# Patient Record
Sex: FEMALE | Race: WHITE | Employment: FULL TIME | URBAN - METROPOLITAN AREA
[De-identification: names, ages, dates, MRNs, and addresses within clinical notes are randomized per-mention and may not be internally consistent; named-entity substitution may affect disease eponyms.]

---

## 2024-08-11 ENCOUNTER — HOSPITAL ENCOUNTER (EMERGENCY)
Age: 44
Discharge: HOME | End: 2024-08-11
Attending: EMERGENCY MEDICINE
Payer: OTHER GOVERNMENT

## 2024-08-11 ENCOUNTER — APPOINTMENT (OUTPATIENT)
Dept: GENERAL RADIOLOGY | Age: 44
End: 2024-08-11
Payer: OTHER GOVERNMENT

## 2024-08-11 VITALS
HEART RATE: 81 BPM | TEMPERATURE: 98 F | RESPIRATION RATE: 18 BRPM | SYSTOLIC BLOOD PRESSURE: 140 MMHG | OXYGEN SATURATION: 98 % | DIASTOLIC BLOOD PRESSURE: 87 MMHG

## 2024-08-11 DIAGNOSIS — S82.899S CLOSED FRACTURE OF ANKLE, UNSPECIFIED LATERALITY, SEQUELA: ICD-10-CM

## 2024-08-11 DIAGNOSIS — S93.409A SPRAIN OF ANKLE, UNSPECIFIED LATERALITY, UNSPECIFIED LIGAMENT, INITIAL ENCOUNTER: Primary | ICD-10-CM

## 2024-08-11 PROCEDURE — 73610 X-RAY EXAM OF ANKLE: CPT

## 2024-08-11 PROCEDURE — 99283 EMERGENCY DEPT VISIT LOW MDM: CPT

## 2024-08-11 PROCEDURE — 6370000000 HC RX 637 (ALT 250 FOR IP): Performed by: EMERGENCY MEDICINE

## 2024-08-11 PROCEDURE — 73590 X-RAY EXAM OF LOWER LEG: CPT

## 2024-08-11 RX ORDER — IBUPROFEN 800 MG/1
800 TABLET ORAL
Qty: 90 TABLET | Refills: 0 | Status: SHIPPED | OUTPATIENT
Start: 2024-08-11 | End: 2024-08-11

## 2024-08-11 RX ORDER — HYDROCODONE BITARTRATE AND ACETAMINOPHEN 5; 325 MG/1; MG/1
1 TABLET ORAL EVERY 6 HOURS PRN
Qty: 8 TABLET | Refills: 0 | Status: SHIPPED | OUTPATIENT
Start: 2024-08-11 | End: 2024-08-11 | Stop reason: CLARIF

## 2024-08-11 RX ORDER — IBUPROFEN 800 MG/1
800 TABLET ORAL
Qty: 90 TABLET | Refills: 0 | Status: SHIPPED | OUTPATIENT
Start: 2024-08-11

## 2024-08-11 RX ORDER — IBUPROFEN 800 MG/1
800 TABLET ORAL
Status: COMPLETED | OUTPATIENT
Start: 2024-08-11 | End: 2024-08-11

## 2024-08-11 RX ORDER — OXYCODONE HYDROCHLORIDE AND ACETAMINOPHEN 5; 325 MG/1; MG/1
2 TABLET ORAL ONCE
Status: COMPLETED | OUTPATIENT
Start: 2024-08-11 | End: 2024-08-11

## 2024-08-11 RX ORDER — HYDROCODONE BITARTRATE AND ACETAMINOPHEN 7.5; 325 MG/1; MG/1
1 TABLET ORAL EVERY 8 HOURS PRN
Qty: 6 TABLET | Refills: 0 | Status: SHIPPED | OUTPATIENT
Start: 2024-08-11 | End: 2024-08-13

## 2024-08-11 RX ORDER — HYDROCODONE BITARTRATE AND ACETAMINOPHEN 5; 325 MG/1; MG/1
1 TABLET ORAL EVERY 6 HOURS PRN
Qty: 8 TABLET | Refills: 0 | Status: SHIPPED | OUTPATIENT
Start: 2024-08-11 | End: 2024-08-11

## 2024-08-11 RX ADMIN — IBUPROFEN 800 MG: 800 TABLET, FILM COATED ORAL at 12:26

## 2024-08-11 RX ADMIN — OXYCODONE HYDROCHLORIDE AND ACETAMINOPHEN 2 TABLET: 5; 325 TABLET ORAL at 12:25

## 2024-08-11 ASSESSMENT — LIFESTYLE VARIABLES
HOW OFTEN DO YOU HAVE A DRINK CONTAINING ALCOHOL: PATIENT DECLINED
HOW MANY STANDARD DRINKS CONTAINING ALCOHOL DO YOU HAVE ON A TYPICAL DAY: PATIENT DECLINED

## 2024-08-11 ASSESSMENT — PAIN DESCRIPTION - LOCATION: LOCATION: ANKLE

## 2024-08-11 ASSESSMENT — PAIN SCALES - GENERAL
PAINLEVEL_OUTOF10: 10
PAINLEVEL_OUTOF10: 10

## 2024-08-11 ASSESSMENT — PAIN DESCRIPTION - ORIENTATION: ORIENTATION: LEFT

## 2024-08-11 NOTE — ED TRIAGE NOTES
Pt coming from sidewalk DT via [facility]. Pt fell while walking down steps, denies LOC, hitting head, blood thinners. Pt has swelling and 9/10 pain to left ankle. PMS present     EMS states VSS en route

## 2024-08-11 NOTE — ED NOTES
Patient mobility status  with difficulty, uses a crutches . Provider aware     I have reviewed discharge instructions with the patient and spouse.  The patient and spouse verbalized understanding.    Patient left ED via Discharge Method: wheelchair to Home with Spouse.    Opportunity for questions and clarification provided.     Patient given 1 scripts.       Jelly Rush RN  08/11/24 [id number]

## 2024-08-11 NOTE — ED PROVIDER NOTES
Emergency Department Provider Note       PCP: No, Pcp   Age: 44 y.o.   Sex: female     DISPOSITION Decision To Discharge 08/11/2024 01:33:59 PM       ICD-10-CM    1. Sprain of ankle, unspecified laterality, unspecified ligament, initial encounter  S93.409A HYDROcodone-acetaminophen (LORCET PLUS) 7.5-325 MG per tablet     DISCONTINUED: HYDROcodone-acetaminophen (NORCO) 5-325 MG per tablet     DISCONTINUED: HYDROcodone-acetaminophen (NORCO) 5-325 MG per tablet      2. Closed fracture of ankle, unspecified laterality, sequela  S82.899S           Medical Decision Making     Patient was observed in the emergency department for nearly 3 hours.  Developed no hemodynamic instability.  All x-ray imaging was independently reviewed by me.  Perhaps a nondisplaced distal fibular fracture is present.  I suspect more likely that there to be a severe ligamentous derangement given the amount of discomfort the patient appears to be in.  Will immobilize the ankle in standard fashion, provide crutches, nonweightbearing although weightbearing as tolerated is probably reasonable.  Will provide orthopedic follow-up in the coming days for persistent pain as an MRI may be prudent for symptoms persist.     1 acute complicated illness or injury.  Shared medical decision making was utilized in creating the patients health plan today.  Considerations: The following items were considered but not ordered: CT ankle.    I independently ordered and reviewed each unique test.       I interpreted the X-rays subtle distal fibula fracture.              History     Patient presents to the emergency department the chief complaint of left ankle pain.  States she was walking down some steps when she missed the step and fell forward.  Zavala pops in both of her ankles but had significant pain in the left ankle persist.  She denies striking her head.  No loss of consciousness.  No prior history of ankle trauma.        Physical Exam     Vitals signs and

## 2024-08-12 ENCOUNTER — OFFICE VISIT (OUTPATIENT)
Dept: ORTHOPEDIC SURGERY | Age: 44
End: 2024-08-12
Payer: COMMERCIAL

## 2024-08-12 DIAGNOSIS — S82.62XA CLOSED LOW LATERAL MALLEOLUS FRACTURE, LEFT, INITIAL ENCOUNTER: Primary | ICD-10-CM

## 2024-08-12 DIAGNOSIS — R52 PAIN: ICD-10-CM

## 2024-08-12 PROCEDURE — 99204 OFFICE O/P NEW MOD 45 MIN: CPT | Performed by: PHYSICIAN ASSISTANT

## 2024-08-12 RX ORDER — HYDROCODONE BITARTRATE AND ACETAMINOPHEN 7.5; 325 MG/1; MG/1
1 TABLET ORAL EVERY 6 HOURS PRN
Qty: 20 TABLET | Refills: 0 | Status: SHIPPED | OUTPATIENT
Start: 2024-08-12 | End: 2024-08-17

## 2024-08-12 RX ORDER — ERGOCALCIFEROL 1.25 MG/1
50000 CAPSULE ORAL WEEKLY
Qty: 8 CAPSULE | Refills: 0 | Status: SHIPPED | OUTPATIENT
Start: 2024-08-12

## 2024-08-12 NOTE — PROGRESS NOTES
Name: Gosia Kimble  YOB: 1980  Gender: female  MRN: [id number]    Treatment Plan:   I had a thorough discussion with the patient regarding the treatment plan   Left distal fibula fracture-Quinn A- closed treatment of fracture without manipulation    Patient already in CAM boot.  May WBAT in the boot  Vitamin D 50,000 units/weekly x 8 weeks  Norco 7.5 mg  Handicap placard    Patient understands and in full agreement with the treatment plan.    Weight-bearing status: WBAT in Boot/hardsole shoe        Return to work/work restrictions: none  Vitamin D2 p.o. 50,000 units, 1 tab every 8 weeks.  I discussed how this is Rx strength.   and Norco 7.5 mg    F/u 4 weeks w/ Ankle Xray  Plan at next visit: Transition to ASO brace       CC: LeftAnkle Pain    HPI: Gosia Kimble is a 44 y.o. female who sustained a fall yesterday, 8/11/2024.  Patient states that she was walking down steps [employer] [facility] when she missed stepped causing her foot and ankle to roll underneath her.  She was transported by EMS to the ED where she was placed into a boot.  Today they have difficulty walking and bearing weight on this ankle due to pain. They describe pain with movement too.     ROS:  Patient Denies fever/chills, headache, visual changes, chest pain, shortness of breath, and nausea/vomiting/diarrhea     Tobacco:  has no history on file for tobacco use.  Diabetes: None    No past medical history on file.  No past medical history on file.    Current Outpatient Medications:     HYDROcodone-acetaminophen (NORCO) 7.5-325 MG per tablet, Take 1 tablet by mouth every 6 hours as needed for Pain for up to 5 days. Intended supply: 5 days. Take lowest dose possible to manage pain Max Daily Amount: 4 tablets, Disp: 20 tablet, Rfl: 0    vitamin D (ERGOCALCIFEROL) 1.25 MG (61340 UT) CAPS capsule, Take 1 capsule by mouth once a week, Disp: 8 capsule, Rfl: 0    ibuprofen (ADVIL;MOTRIN) 800 MG tablet, Take 1 tablet by mouth 3 times daily (with

## 2024-08-13 ENCOUNTER — TELEPHONE (OUTPATIENT)
Dept: ORTHOPEDIC SURGERY | Age: 44
End: 2024-08-13

## 2024-08-30 DIAGNOSIS — S82.62XA CLOSED LOW LATERAL MALLEOLUS FRACTURE, LEFT, INITIAL ENCOUNTER: Primary | ICD-10-CM

## 2024-08-30 NOTE — TELEPHONE ENCOUNTER
She called [facility] and can't get ahold of anyone so she is wanting you to send it to [facility] in [place]. She has called and confirmed that they do have it.

## 2024-08-30 NOTE — TELEPHONE ENCOUNTER
She is calling for a refill of her Norco to be sent to the [facility] on file. She is going to call now and make sure they have it in stock and will call us back with where to send it if they don't.  She is just now coming back from a trip out of the country and needs a refill.

## 2024-09-03 RX ORDER — HYDROCODONE BITARTRATE AND ACETAMINOPHEN 7.5; 325 MG/1; MG/1
1 TABLET ORAL 2 TIMES DAILY
Qty: 60 TABLET | Refills: 0 | Status: SHIPPED | OUTPATIENT
Start: 2024-09-03 | End: 2024-10-03

## 2024-09-09 ENCOUNTER — OFFICE VISIT (OUTPATIENT)
Dept: ORTHOPEDIC SURGERY | Age: 44
End: 2024-09-09
Payer: COMMERCIAL

## 2024-09-09 DIAGNOSIS — S82.62XD CLOSED LOW LATERAL MALLEOLUS FRACTURE, LEFT, WITH ROUTINE HEALING, SUBSEQUENT ENCOUNTER: Primary | ICD-10-CM

## 2024-09-09 PROCEDURE — 4004F PT TOBACCO SCREEN RCVD TLK: CPT | Performed by: PHYSICIAN ASSISTANT

## 2024-09-09 PROCEDURE — G8428 CUR MEDS NOT DOCUMENT: HCPCS | Performed by: PHYSICIAN ASSISTANT

## 2024-09-09 PROCEDURE — G8421 BMI NOT CALCULATED: HCPCS | Performed by: PHYSICIAN ASSISTANT

## 2024-09-09 PROCEDURE — 99213 OFFICE O/P EST LOW 20 MIN: CPT | Performed by: PHYSICIAN ASSISTANT

## 2024-10-09 ENCOUNTER — OFFICE VISIT (OUTPATIENT)
Dept: ORTHOPEDIC SURGERY | Age: 44
End: 2024-10-09
Payer: COMMERCIAL

## 2024-10-09 DIAGNOSIS — S82.62XD CLOSED LOW LATERAL MALLEOLUS FRACTURE, LEFT, WITH ROUTINE HEALING, SUBSEQUENT ENCOUNTER: Primary | ICD-10-CM

## 2024-10-09 PROCEDURE — 99213 OFFICE O/P EST LOW 20 MIN: CPT | Performed by: PHYSICIAN ASSISTANT

## 2024-10-09 PROCEDURE — G8484 FLU IMMUNIZE NO ADMIN: HCPCS | Performed by: PHYSICIAN ASSISTANT

## 2024-10-09 PROCEDURE — G8428 CUR MEDS NOT DOCUMENT: HCPCS | Performed by: PHYSICIAN ASSISTANT

## 2024-10-09 PROCEDURE — G8421 BMI NOT CALCULATED: HCPCS | Performed by: PHYSICIAN ASSISTANT

## 2024-10-09 PROCEDURE — L1902 AFO ANKLE GAUNTLET PRE OTS: HCPCS | Performed by: PHYSICIAN ASSISTANT

## 2024-10-09 PROCEDURE — 4004F PT TOBACCO SCREEN RCVD TLK: CPT | Performed by: PHYSICIAN ASSISTANT

## 2024-10-09 NOTE — PROGRESS NOTES
The patient was prescribed a Wraptor brace for the patient's leftfoot. The patient wears a size 9.5 shoe and I fitted the patient with a M brace. I explained how to fit the brace properly by pulling the lace tabs across top of foot first then under arch and lastly pulling the strap up firmly and attaching to the lateral Velcro strip. Thus forming a figure 8 across the ankle joint. Once the figure 8 is completed they are to secure the top (short circumferential) straps to help avoid the straps from loosening with normal wear.  The patient was able to demonstrate proper fitting in office to ensure compliance with device and acknowledged satisfaction with current fit. Patient read and signed documenting they understand and agree to [facility]'s current DME return policy.

## 2024-10-09 NOTE — PROGRESS NOTES
Name: Gosia Kimble  YOB: 1980  Gender: female  MRN: [id number]    Treatment Plan:   I had a thorough discussion with the patient regarding the treatment plan   Left distal fibula fracture-Quinn A- closed treatment of fracture without manipulation    Patient will transition from CAM boot to ASO brace.  ASO brace given today.  This is medically necessary as patient transitions from the boot to the brace for increased stability and functionality  Begin formal physical therapy    Patient understands and in full agreement with the treatment plan.    Weight-bearing status: WBAT        Return to work/work restrictions: none      F/u 6 weeks w/ Ankle Xray  Plan at next visit: Expect full release at this time       CC: LeftAnkle Pain    HPI: Gosia Kimble is a 44 y.o. female who sustained a fall yesterday, 8/11/2024.  Patient states that she was walking down [facility] when she missed stepped causing her foot and ankle to roll underneath her.  She was transported by EMS to the ED where she was placed into a boot.  Today they have difficulty walking and bearing weight on this ankle due to pain. They describe pain with movement too.     9/9/24-patient states she continues to have significant pain along the lateral ankle.  She states that it is better than her previous visit but she really has not been putting much weight on it and has been using a motorized wheelchair.  She states the hypersensitivity has improved.  She is very tearful today.    10/9/2024-patient states that she is doing much better.  She is ambulating in the boot with a walker.  She states over the last few weeks her pain has significantly improved and she has been able to ambulate more.  She states she is ready to out of the boot at this time.    ROS:  Patient Denies fever/chills, headache, visual changes, chest pain, shortness of breath, and nausea/vomiting/diarrhea     Tobacco:  has no history on file for tobacco use.  Diabetes:

## 2024-10-14 NOTE — PROGRESS NOTES
Gosia Lamin  : 1980  Primary: [facility] Shared Services (Commercial)  Secondary:  [facility] [address]  [facility] [address]  Phone: [phone]  Fax: [phone] Plan Frequency: 2 times a week for 8 weeks (With potential to decrease frequency to 1x week based on patient presentation)  Plan of Care/Certification Expiration Date: 12/15/24 (POC starting 10/16/24)        Plan of Care/Certification Expiration Date:  Plan of Care/Certification Expiration Date: 12/15/24 (POC starting 10/16/24)    Frequency/Duration: Plan Frequency: 2 times a week for 8 weeks (With potential to decrease frequency to 1x week based on patient presentation)      Time In/Out:   Time In: 1230  Time Out: 1330      PT Visit Info:         Visit Count:  1    OUTPATIENT PHYSICAL THERAPY:   Treatment Note 10/16/2024       Episode  (Foot/Ankle Pain)               Treatment Diagnosis:    Closed displaced fracture of lateral malleolus of left fibula with routine healing  Muscle weakness (generalized)  Difficulty in walking, not elsewhere classified  Medical/Referring Diagnosis:    Closed low lateral malleolus fracture, left, with routine healing, subsequent encounter [S82.62XD]    Referring Physician:  Arlene Canchola PA MD Orders:  PT Eval and Treat   Return MD Appt:  24   Date of Onset:  Onset Date: 24 (Patient reports fall on date listed.)     Allergies:   Patient has no known allergies.  Restrictions/Precautions:   WBAT      Interventions Planned (Treatment may consist of any combination of the following):     See Assessment Note    Subjective Comments:   Patient agreeable to therapy and eager to feel better.   Initial Pain Level::      /10  Post Session Pain Level:        /10  Medications Last Reviewed:  10/16/2024  Updated Objective Findings:  See Evaluation Note from today  Treatment   THERAPEUTIC EXERCISE: (15 minutes):  Not billed  Exercises per grid below to

## 2024-10-14 NOTE — THERAPY EVALUATION
Gosia Garcian  : 1980  Primary: [facility] Shared Services (Commercial)  Secondary:  [facility] [address]  [facility] [address]  Phone: [phone]  Fax: [phone] Plan Frequency: 2 times a week for 8 weeks (With potential to decrease frequency to 1x week based on patient presentation)  Plan of Care/Certification Expiration Date: 12/15/24 (POC starting 10/16/24)        Plan of Care/Certification Expiration Date:  Plan of Care/Certification Expiration Date: 12/15/24 (POC starting 10/16/24)    Frequency/Duration: Plan Frequency: 2 times a week for 8 weeks (With potential to decrease frequency to 1x week based on patient presentation)      Time In/Out:   Time In: 1230  Time Out: 1330      PT Visit Info:         Visit Count:  1                OUTPATIENT PHYSICAL THERAPY:             Initial Assessment 10/16/2024               Episode (Foot/Ankle Pain)         Treatment Diagnosis:     Closed displaced fracture of lateral malleolus of left fibula with routine healing  Muscle weakness (generalized)  Difficulty in walking, not elsewhere classified  Medical/Referring Diagnosis:    Closed low lateral malleolus fracture, left, with routine healing, subsequent encounter [S82.62XD]    Referring Physician:  Arlene Canchola PA MD Orders:  PT Eval and Treat   Return MD Appt:  24  Date of Onset:  Onset Date: 24 (Patient reports fall on date listed.)     Allergies:  Patient has no known allergies.  Restrictions/Precautions:    WBAT      Medications Last Reviewed:  10/16/2024     SUBJECTIVE   History of Injury/Illness (Reason for Referral):  Patient is a pleasant 44 year old female presenting with L foot and ankle pain s/p fibular fx from a fall on 24 while coming out of the [facility]. Patient slipped after stepping down on an uneven step. Patient was immobilized and in a wheelchair for 1 month followed by a cam boot and walker for 1 month.

## 2024-10-16 ENCOUNTER — HOSPITAL ENCOUNTER (OUTPATIENT)
Dept: PHYSICAL THERAPY | Age: 44
Setting detail: RECURRING SERIES
Discharge: HOME | End: 2024-10-19
Payer: COMMERCIAL

## 2024-10-16 DIAGNOSIS — M62.81 MUSCLE WEAKNESS (GENERALIZED): ICD-10-CM

## 2024-10-16 DIAGNOSIS — S82.62XD CLOSED DISPLACED FRACTURE OF LATERAL MALLEOLUS OF LEFT FIBULA WITH ROUTINE HEALING: Primary | ICD-10-CM

## 2024-10-16 DIAGNOSIS — R26.2 DIFFICULTY IN WALKING, NOT ELSEWHERE CLASSIFIED: ICD-10-CM

## 2024-10-16 PROCEDURE — 97162 PT EVAL MOD COMPLEX 30 MIN: CPT

## 2024-10-18 ENCOUNTER — HOSPITAL ENCOUNTER (OUTPATIENT)
Dept: PHYSICAL THERAPY | Age: 44
Setting detail: RECURRING SERIES
Discharge: HOME | End: 2024-10-21
Payer: COMMERCIAL

## 2024-10-18 PROCEDURE — 97110 THERAPEUTIC EXERCISES: CPT

## 2024-10-18 PROCEDURE — 97140 MANUAL THERAPY 1/> REGIONS: CPT

## 2024-10-18 ASSESSMENT — PAIN SCALES - GENERAL: PAINLEVEL_OUTOF10: 3

## 2024-10-18 NOTE — PROGRESS NOTES
Gosia Garcian  : 1980  Primary: [facility] Shared Services (Commercial)  Secondary:  [facility] [place]  [address]  [facility] [address]  Phone: [phone]  Fax: [phone] Plan Frequency: 2 times a week for 8 weeks (With potential to decrease frequency to 1x week based on patient presentation)  Plan of Care/Certification Expiration Date: 12/15/24 (POC starting 10/16/24)        Plan of Care/Certification Expiration Date:  Plan of Care/Certification Expiration Date: 12/15/24 (POC starting 10/16/24)    Frequency/Duration: Plan Frequency: 2 times a week for 8 weeks (With potential to decrease frequency to 1x week based on patient presentation)      Time In/Out:   Time In: 1100  Time Out: 1155      PT Visit Info:    Progress Note Counter: 2      Visit Count:  2    OUTPATIENT PHYSICAL THERAPY:   Treatment Note 10/18/2024       Episode  (Foot/Ankle Pain)               Treatment Diagnosis:    Closed displaced fracture of lateral malleolus of left fibula with routine healing  Muscle weakness (generalized)  Difficulty in walking, not elsewhere classified  Medical/Referring Diagnosis:    Closed low lateral malleolus fracture, left, with routine healing, subsequent encounter [S82.62XD]    Referring Physician:  Arlene Canchola PA MD Orders:  PT Eval and Treat   Return MD Appt:  24   Date of Onset:  Onset Date: 24 (Patient reports fall on date listed.)     Allergies:   Patient has no known allergies.  Restrictions/Precautions:   WBAT      Interventions Planned (Treatment may consist of any combination of the following):     See Assessment Note    Subjective Comments: Patient reported increased soreness after her intial evaluation: HEP compliance good.    Initial Pain Level::     3/10  Post Session Pain Level:       2/10  Medications Last Reviewed:  10/18/2024  Updated Objective Findings:   Palpation: Moderate tenderness and soreness at left lateral

## 2024-10-21 ENCOUNTER — HOSPITAL ENCOUNTER (OUTPATIENT)
Dept: PHYSICAL THERAPY | Age: 44
Setting detail: RECURRING SERIES
Discharge: HOME | End: 2024-10-24
Payer: COMMERCIAL

## 2024-10-21 PROCEDURE — 97140 MANUAL THERAPY 1/> REGIONS: CPT

## 2024-10-21 PROCEDURE — 97110 THERAPEUTIC EXERCISES: CPT

## 2024-10-21 ASSESSMENT — PAIN SCALES - GENERAL: PAINLEVEL_OUTOF10: 4

## 2024-10-21 NOTE — PROGRESS NOTES
Gosia Garcian  : 1980  Primary: [facility] Shared Services (Commercial)  Secondary:  [facility] [place]  [address]  [facility] [address]  Phone: [phone]  Fax: [phone] Plan Frequency: 2 times a week for 8 weeks (With potential to decrease frequency to 1x week based on patient presentation)  Plan of Care/Certification Expiration Date: 12/15/24 (POC starting 10/16/24)        Plan of Care/Certification Expiration Date:  Plan of Care/Certification Expiration Date: 12/15/24 (POC starting 10/16/24)    Frequency/Duration: Plan Frequency: 2 times a week for 8 weeks (With potential to decrease frequency to 1x week based on patient presentation)      Time In/Out:   Time In: 0800  Time Out: 0900      PT Visit Info:    Progress Note Counter: 3      Visit Count:  3    OUTPATIENT PHYSICAL THERAPY:   Treatment Note 10/21/2024       Episode  (Foot/Ankle Pain)               Treatment Diagnosis:    Closed displaced fracture of lateral malleolus of left fibula with routine healing  Muscle weakness (generalized)  Difficulty in walking, not elsewhere classified  Medical/Referring Diagnosis:    Closed low lateral malleolus fracture, left, with routine healing, subsequent encounter [S82.62XD]    Referring Physician:  Arlene Canchola PA MD Orders:  PT Eval and Treat   Return MD Appt:  24   Date of Onset:  Onset Date: 24 (Patient reports fall on date listed.)     Allergies:   Patient has no known allergies.  Restrictions/Precautions:   WBAT      Interventions Planned (Treatment may consist of any combination of the following):     See Assessment Note    Subjective Comments: Patient reports increased pain from being active this weekend.     Initial Pain Level::     4/10  Post Session Pain Level:       3/10  Medications Last Reviewed:  10/21/2024  Updated Objective Findings:  Antalgic gait with decreased stance on L LE.  Treatment   THERAPEUTIC EXERCISE: (40

## 2024-10-22 NOTE — PROGRESS NOTES
Gosia Lamin  : 1980  Primary: [facility] Shared Services (Commercial)  Secondary:  [facility] [address]  [facility] [address]  Phone: [phone]  Fax: [phone] Plan Frequency: 2 times a week for 8 weeks (With potential to decrease frequency to 1x week based on patient presentation)  Plan of Care/Certification Expiration Date: 12/15/24 (POC starting 10/16/24)        Plan of Care/Certification Expiration Date:  Plan of Care/Certification Expiration Date: 12/15/24 (POC starting 10/16/24)    Frequency/Duration: Plan Frequency: 2 times a week for 8 weeks (With potential to decrease frequency to 1x week based on patient presentation)      Time In/Out:   Time In: 1230  Time Out: 1330      PT Visit Info:    Progress Note Counter: 4      Visit Count:  4    OUTPATIENT PHYSICAL THERAPY:   Treatment Note 10/23/2024       Episode  (Foot/Ankle Pain)               Treatment Diagnosis:    Closed displaced fracture of lateral malleolus of left fibula with routine healing  Muscle weakness (generalized)  Difficulty in walking, not elsewhere classified  Medical/Referring Diagnosis:    Closed low lateral malleolus fracture, left, with routine healing, subsequent encounter [S82.62XD]    Referring Physician:  Arlene Canchola PA MD Orders:  PT Eval and Treat   Return MD Appt:  24   Date of Onset:  Onset Date: 24 (Patient reports fall on date listed.)     Allergies:   Patient has no known allergies.  Restrictions/Precautions:   WBAT      Interventions Planned (Treatment may consist of any combination of the following):     See Assessment Note    Subjective Comments: Patient reports feeling a good bit better today with decreased pain.     Initial Pain Level::     4/10  Post Session Pain Level:       3/10  Medications Last Reviewed:  10/23/2024  Updated Objective Findings:  Antalgic gait, improved AROM Eversion/Inversion today.  Treatment   THERAPEUTIC

## 2024-10-23 ENCOUNTER — HOSPITAL ENCOUNTER (OUTPATIENT)
Dept: PHYSICAL THERAPY | Age: 44
Setting detail: RECURRING SERIES
Discharge: HOME | End: 2024-10-26
Payer: COMMERCIAL

## 2024-10-23 PROCEDURE — 97110 THERAPEUTIC EXERCISES: CPT

## 2024-10-23 PROCEDURE — 97140 MANUAL THERAPY 1/> REGIONS: CPT

## 2024-10-23 ASSESSMENT — PAIN SCALES - GENERAL: PAINLEVEL_OUTOF10: 4

## 2024-10-28 ENCOUNTER — HOSPITAL ENCOUNTER (OUTPATIENT)
Dept: PHYSICAL THERAPY | Age: 44
Setting detail: RECURRING SERIES
Discharge: HOME | End: 2024-10-31
Payer: COMMERCIAL

## 2024-10-28 PROCEDURE — 97110 THERAPEUTIC EXERCISES: CPT

## 2024-10-28 PROCEDURE — 97140 MANUAL THERAPY 1/> REGIONS: CPT

## 2024-10-28 ASSESSMENT — PAIN SCALES - GENERAL: PAINLEVEL_OUTOF10: 3

## 2024-10-28 NOTE — PROGRESS NOTES
Gosia Kimble  : 1980  Primary: [facility] Shared Services (Commercial)  Secondary:  [facility] [address]  [facility] [address]  Phone: [phone]  Fax: [phone] Plan Frequency: 2 times a week for 8 weeks (With potential to decrease frequency to 1x week based on patient presentation)  Plan of Care/Certification Expiration Date: 12/15/24 (POC starting 10/16/24)        Plan of Care/Certification Expiration Date:  Plan of Care/Certification Expiration Date: 12/15/24 (POC starting 10/16/24)    Frequency/Duration: Plan Frequency: 2 times a week for 8 weeks (With potential to decrease frequency to 1x week based on patient presentation)      Time In/Out:   Time In: 802  Time Out: 858      PT Visit Info:    Progress Note Counter: 5      Visit Count:  5    OUTPATIENT PHYSICAL THERAPY:   Treatment Note 10/28/2024       Episode  (Foot/Ankle Pain)               Treatment Diagnosis:    Closed displaced fracture of lateral malleolus of left fibula with routine healing  Muscle weakness (generalized)  Difficulty in walking, not elsewhere classified  Medical/Referring Diagnosis:    Closed low lateral malleolus fracture, left, with routine healing, subsequent encounter [S82.62XD]    Referring Physician:  Arlene Canchola PA MD Orders:  PT Eval and Treat   Return MD Appt:  24   Date of Onset:  Onset Date: 24 (Patient reports fall on date listed.)     Allergies:   Patient has no known allergies.  Restrictions/Precautions:   WBAT      Interventions Planned (Treatment may consist of any combination of the following):     See Assessment Note    Subjective Comments: Patient reported she cooked for the first time since the accident yesturday and she had some increased soreness that night. Patient stated the soreness is \"a little better\" this morning.     Initial Pain Level::     310  Post Session Pain Level:       2/10  Medications Last Reviewed:

## 2024-11-01 ENCOUNTER — HOSPITAL ENCOUNTER (OUTPATIENT)
Dept: PHYSICAL THERAPY | Age: 44
Setting detail: RECURRING SERIES
Discharge: HOME | End: 2024-11-04
Payer: COMMERCIAL

## 2024-11-01 PROCEDURE — 97110 THERAPEUTIC EXERCISES: CPT

## 2024-11-01 PROCEDURE — 97140 MANUAL THERAPY 1/> REGIONS: CPT

## 2024-11-01 ASSESSMENT — PAIN SCALES - GENERAL: PAINLEVEL_OUTOF10: 5

## 2024-11-01 NOTE — PROGRESS NOTES
Gosia Kimble  : 1980  Primary: [facility] Shared Services (Commercial)  Secondary:  [facility] [address]  [facility] [address]  Phone: [phone]  Fax: [phone] Plan Frequency: 2 times a week for 8 weeks (With potential to decrease frequency to 1x week based on patient presentation)  Plan of Care/Certification Expiration Date: 12/15/24 (POC starting 10/16/24)        Plan of Care/Certification Expiration Date:  Plan of Care/Certification Expiration Date: 12/15/24 (POC starting 10/16/24)    Frequency/Duration: Plan Frequency: 2 times a week for 8 weeks (With potential to decrease frequency to 1x week based on patient presentation)      Time In/Out:   Time In: 08  Time Out: 900      PT Visit Info:    Progress Note Counter: 6      Visit Count:  6    OUTPATIENT PHYSICAL THERAPY:   Treatment Note 2024       Episode  (Foot/Ankle Pain)               Treatment Diagnosis:    Closed displaced fracture of lateral malleolus of left fibula with routine healing  Muscle weakness (generalized)  Difficulty in walking, not elsewhere classified  Medical/Referring Diagnosis:    Closed low lateral malleolus fracture, left, with routine healing, subsequent encounter [S82.62XD]    Referring Physician:  Arlene Canchola PA MD Orders:  PT Eval and Treat   Return MD Appt:  24   Date of Onset:  Onset Date: 24 (Patient reports fall on date listed.)     Allergies:   Patient has no known allergies.  Restrictions/Precautions:   WBAT      Interventions Planned (Treatment may consist of any combination of the following):     See Assessment Note    Subjective Comments: Patient reported she cooked for the first time since the accident yesturday and she had some increased soreness that night. Patient stated the soreness is \"a little better\" this morning.  Initial Pain Level::     5/10  Post Session Pain Level:       4/10  Medications Last Reviewed:

## 2024-11-06 ENCOUNTER — HOSPITAL ENCOUNTER (OUTPATIENT)
Dept: PHYSICAL THERAPY | Age: 44
Setting detail: RECURRING SERIES
Discharge: HOME | End: 2024-11-09
Payer: COMMERCIAL

## 2024-11-06 PROCEDURE — 97110 THERAPEUTIC EXERCISES: CPT

## 2024-11-06 PROCEDURE — 97140 MANUAL THERAPY 1/> REGIONS: CPT

## 2024-11-06 ASSESSMENT — PAIN SCALES - GENERAL: PAINLEVEL_OUTOF10: 3

## 2024-11-06 NOTE — PROGRESS NOTES
malleolus.  Treatment   THERAPEUTIC EXERCISE: (42 minutes):  Exercises per grid below to improve mobility, strength, balance, and coordination.  Required minimal visual, verbal, manual, and tactile cues to promote proper body alignment, promote proper body posture, promote proper body mechanics, and promote proper body breathing techniques.  Progressed resistance, range, repetitions, and complexity of movement as indicated.   Date:  11/6/24 Date:  10/28/24 Date:  11/1/24   Activity/Exercise Parameters Parameters Parameters   Nu Step 8 mins no resistance 6 min no resistance 8 mins no resistance   Calf stretch  Long sitting towel stretch, 3x30 sec  3x30 sec Long sitting towel stretch, 3x30 sec    Leg Raise 2x10 supine  3x5 supine  Verbal review   Bridge 2x10 supine 3x5 supine Verbal review   Ankle Pump 3x10 3x10 3x10   Eversion/Inversion 3x10 AROM    5x5sec holds each inversion and eversion  3X10 AROM    2x5 isometric eversion 3x10 AROM    5x5sec holds each inversion and eversion    Heel Raise 3x10 AROM 3x10 seated 3x10 AROM   Toe Raise 3x10 seated 3x10 seated 3x10 seated   Toe scrunches  Flex/ext - 3x10 seated    Ev/Inv - x15 ea 3x10 seated 3x10 seated   Toe yoga 2x10 seated 2x8 seated 2x10 seated   Arch lift [id number] seated     PROM 8 mins 8 min 8 mins   Plantarflexion 3x10 seated    3x10 supine with yellow ball for inversion moment  3x10 seated    3x10 supine with yellow ball for inversion moment  3x10 seated    3x10 supine with yellow ball for inversion moment        Time spent with patient reviewing proper muscle recruitment and technique with exercises.     MANUAL THERAPY: (12 minutes):   Joint mobilization and Soft tissue mobilization was utilized and necessary because of the patient's restricted joint motion and painful spasm.   Soft Tissue Mobilization Left lateral malleolus, Gastroc, Soleus, Posterior Tib, Anterior Tib.  Joint Mobilization Talocrural AP Grade II, mid/fore foot sup/pro grade

## 2024-11-07 NOTE — PROGRESS NOTES
Gosia Lamin  : 1980  Primary: [facility] Shared Services (Commercial)  Secondary:  [facility] @ [address]  [facility] [address]  Phone: [phone]  Fax: [phone] Plan Frequency: 2 times a week for 8 weeks (With potential to decrease frequency to 1x week based on patient presentation)  Plan of Care/Certification Expiration Date: 12/15/24 (POC starting 10/16/24)        Plan of Care/Certification Expiration Date:  Plan of Care/Certification Expiration Date: 12/15/24 (POC starting 10/16/24)    Frequency/Duration: Plan Frequency: 2 times a week for 8 weeks (With potential to decrease frequency to 1x week based on patient presentation)      Time In/Out:   Time In: 805  Time Out: 903      PT Visit Info:    Progress Note Counter: 0      Visit Count:  8    OUTPATIENT PHYSICAL THERAPY:   Treatment Note 2024       Episode  (Foot/Ankle Pain)               Treatment Diagnosis:    Closed displaced fracture of lateral malleolus of left fibula with routine healing  Muscle weakness (generalized)  Difficulty in walking, not elsewhere classified  Medical/Referring Diagnosis:    Closed low lateral malleolus fracture, left, with routine healing, subsequent encounter [S82.62XD]    Referring Physician:  Arlene Canchola PA MD Orders:  PT Eval and Treat   Return MD Appt:  24   Date of Onset:  Onset Date: 24 (Patient reports fall on date listed.)     Allergies:   Patient has no known allergies.  Restrictions/Precautions:   WBAT      Interventions Planned (Treatment may consist of any combination of the following):     See Assessment Note    Subjective Comments: Patient reports increased soreness following last visit however today more stiffness noted.    Initial Pain Level::     4/10  Post Session Pain Level:       2/10  Medications Last Reviewed:  2024  Updated Objective Findings:  See progress note dated 2024.  Treatment   THERAPEUTIC

## 2024-11-07 NOTE — PROGRESS NOTES
Gosia Garcian  : 1980  Primary: [facility] Shared Services (Commercial)  Secondary:  [facility] @ [address]  [employer] [address]  Phone: [phone]  Fax: [phone] Plan Frequency: 2 times a week for 8 weeks (With potential to decrease frequency to 1x week based on patient presentation)  Plan of Care/Certification Expiration Date: 12/15/24 (POC starting 10/16/24)        Plan of Care/Certification Expiration Date:  Plan of Care/Certification Expiration Date: 12/15/24 (POC starting 10/16/24)    Frequency/Duration: Plan Frequency: 2 times a week for 8 weeks (With potential to decrease frequency to 1x week based on patient presentation)      Time In/Out:   Time In: 805  Time Out: 903      PT Visit Info:    Progress Note Counter: 0      Visit Count:  8                OUTPATIENT PHYSICAL THERAPY:             Progress Report 2024               Episode (Foot/Ankle Pain)         Treatment Diagnosis:     Closed displaced fracture of lateral malleolus of left fibula with routine healing  Muscle weakness (generalized)  Difficulty in walking, not elsewhere classified  Medical/Referring Diagnosis:    Closed low lateral malleolus fracture, left, with routine healing, subsequent encounter [S82.62XD]    Referring Physician:  Arlene Canchola PA MD Orders:  PT Eval and Treat   Return MD Appt:  24  Date of Onset:  Onset Date: 24 (Patient reports fall on date listed.)     Allergies:  Patient has no known allergies.  Restrictions/Precautions:    WBAT      Medications Last Reviewed:  2024      OBJECTIVE     Patient denies any LE paresthesia. Patient denies any increase of symptoms with cough, sneeze or valsalva. Patient denies any saddle paresthesia or bowel/bladder deficits.     Observation/Orthostatic Postural Assessment:          Gait: ASO brace donned, moderate antalgic gait pattern observed without use of AD.    Palpation:

## 2024-11-08 ENCOUNTER — HOSPITAL ENCOUNTER (OUTPATIENT)
Dept: PHYSICAL THERAPY | Age: 44
Setting detail: RECURRING SERIES
Discharge: HOME | End: 2024-11-11
Payer: COMMERCIAL

## 2024-11-08 PROCEDURE — 97140 MANUAL THERAPY 1/> REGIONS: CPT

## 2024-11-08 PROCEDURE — 97110 THERAPEUTIC EXERCISES: CPT

## 2024-11-08 ASSESSMENT — PAIN SCALES - GENERAL: PAINLEVEL_OUTOF10: 4

## 2024-11-13 ENCOUNTER — HOSPITAL ENCOUNTER (OUTPATIENT)
Dept: PHYSICAL THERAPY | Age: 44
Setting detail: RECURRING SERIES
Discharge: HOME | End: 2024-11-16
Payer: COMMERCIAL

## 2024-11-13 PROCEDURE — 97140 MANUAL THERAPY 1/> REGIONS: CPT

## 2024-11-13 PROCEDURE — 97110 THERAPEUTIC EXERCISES: CPT

## 2024-11-13 ASSESSMENT — PAIN SCALES - GENERAL: PAINLEVEL_OUTOF10: 3

## 2024-11-13 NOTE — PROGRESS NOTES
ankle.  Treatment   THERAPEUTIC EXERCISE: (38 minutes):  Exercises per grid below to improve mobility, strength, balance, and coordination.  Required minimal visual, verbal, manual, and tactile cues to promote proper body alignment, promote proper body posture, promote proper body mechanics, and promote proper body breathing techniques.  Progressed resistance, range, repetitions, and complexity of movement as indicated.   Date:  11/6/24 Date:  11/8/24 Date:  11/13/24   Activity/Exercise Parameters Parameters Parameters   Nu Step 8 mins no resistance 10 min no resistance 10 min no resistance   Calf stretch  Long sitting towel stretch, 3x30 sec  Long sitting towel stretch, 3x30 sec  --   Leg Raise 2x10 supine  SLR 3x10    S/l hip abd, 1x10 --   Bridge 2x10 supine -- HEP   Ankle Pump 3x10 3x10 3x10   Eversion/Inversion 3x10 AROM    5x5sec holds each inversion and eversion  3X10 AROM    1x10 5 sec holds each inversion and eversion  --   Heel Raise 3x10 AROM 3x10 seated [id number] hold AROM   Toe Raise 3x10 seated 3x10 seated [id number] hold seated   Toe scrunches  Flex/ext - 3x10 seated    Ev/Inv - x15 ea 3x10 seated 3x10 seated   Toe yoga 2x10 seated 2x10 seated --   Arch lift [id number] seated Verbal review 2x10   PROM 8 mins 5 min 10 mins with STM   Plantarflexion 3x10 seated    3x10 supine with yellow ball for inversion moment  3x10 seated 3x10 seated       Time spent with patient reviewing proper muscle recruitment and technique with exercises.     MANUAL THERAPY: (16 minutes):   Joint mobilization and Soft tissue mobilization was utilized and necessary because of the patient's restricted joint motion and painful spasm.   Soft Tissue Mobilization Left lateral malleolus, Gastroc, Soleus, Posterior Tib, Anterior Tib.  Joint Mobilization Talocrural AP Grade II, mid/fore foot sup/pro grade II/III.    MODALITIES: (0 minutes):        None today      HEP: As above; handouts given to patient for all exercises.

## 2024-11-15 ENCOUNTER — HOSPITAL ENCOUNTER (OUTPATIENT)
Dept: PHYSICAL THERAPY | Age: 44
Setting detail: RECURRING SERIES
Discharge: HOME | End: 2024-11-18
Payer: COMMERCIAL

## 2024-11-15 PROCEDURE — 97110 THERAPEUTIC EXERCISES: CPT

## 2024-11-15 PROCEDURE — 97140 MANUAL THERAPY 1/> REGIONS: CPT

## 2024-11-15 ASSESSMENT — PAIN SCALES - GENERAL: PAINLEVEL_OUTOF10: 3

## 2024-11-15 NOTE — PROGRESS NOTES
handouts given to patient for all exercises.       Treatment/Session Summary:    Treatment Assessment: Fair to good tolerance to exercise program observed today; added PF stretching with intermittent inversion bias to address observed ant tib and peroneal muscle tightness which reduce pt's anterior ankle/lower leg pain significantly.      Communication/Consultation:  None today  Equipment provided today:  None  Recommendations/Intent for next treatment session: Next visit will focus on participation in graded exercise program to improve activity tolerance and functional indep.    >Total Treatment Billable Duration:  54 minutes  Time In: 1104  Time Out: 1202    William Carreon PT         Charge Capture  Events  [employer] Portal  Appt Desk  Attendance Report     Future Appointments   Date Time Provider Department Center   11/20/2024  1:00 PM File, MAEGAN Cota [facility]   11/21/2024  8:00 AM William Carreon PT [facility]

## 2024-11-20 ENCOUNTER — OFFICE VISIT (OUTPATIENT)
Dept: ORTHOPEDIC SURGERY | Age: 44
End: 2024-11-20
Payer: COMMERCIAL

## 2024-11-20 DIAGNOSIS — S82.62XD CLOSED LOW LATERAL MALLEOLUS FRACTURE, LEFT, WITH ROUTINE HEALING, SUBSEQUENT ENCOUNTER: Primary | ICD-10-CM

## 2024-11-20 PROCEDURE — G8484 FLU IMMUNIZE NO ADMIN: HCPCS | Performed by: PHYSICIAN ASSISTANT

## 2024-11-20 PROCEDURE — G8421 BMI NOT CALCULATED: HCPCS | Performed by: PHYSICIAN ASSISTANT

## 2024-11-20 PROCEDURE — 99213 OFFICE O/P EST LOW 20 MIN: CPT | Performed by: PHYSICIAN ASSISTANT

## 2024-11-20 PROCEDURE — 4004F PT TOBACCO SCREEN RCVD TLK: CPT | Performed by: PHYSICIAN ASSISTANT

## 2024-11-20 PROCEDURE — G8428 CUR MEDS NOT DOCUMENT: HCPCS | Performed by: PHYSICIAN ASSISTANT

## 2024-11-20 NOTE — PROGRESS NOTES
Name: Gosia Kimble  YOB: 1980  Gender: female  MRN: [id number]    Treatment Plan:   I had a thorough discussion with the patient regarding the treatment plan   Left distal fibula fracture-Quinn A- closed treatment of fracture without manipulation    Wean out of ASO brace as tolerated  New order for physical therapy given today  New handicap placard  Increase activities as tolerated    Patient understands and in full agreement with the treatment plan.    Weight-bearing status: WBAT        Return to work/work restrictions: none      F/u as needed       CC: LeftAnkle Pain    HPI: Gosia Kimble is a 44 y.o. female who sustained a fall yesterday, 8/11/2024.  Patient states that she was walking down steps [facility] when she missed stepped causing her foot and ankle to roll underneath her.  She was transported by EMS to the ED where she was placed into a boot.  Today they have difficulty walking and bearing weight on this ankle due to pain. They describe pain with movement too.     9/9/24-patient states she continues to have significant pain along the lateral ankle.  She states that it is better than her previous visit but she really has not been putting much weight on it and has been using a motorized wheelchair.  She states the hypersensitivity has improved.  She is very tearful today.    10/9/2024-patient states that she is doing much better.  She is ambulating in the boot with a walker.  She states over the last few weeks her pain has significantly improved and she has been able to ambulate more.  She states she is ready to out of the boot at this time.    11/20/2024-patient states she is doing well.  She denies any significant pain.  She states that when she does have pain it is along the anterior ankle.  She has been wearing the ASO brace.  She is working with physical therapy and states that is going well.  She still notes some start up soreness and stiffness but states that that improves as

## 2024-11-21 ENCOUNTER — HOSPITAL ENCOUNTER (OUTPATIENT)
Dept: PHYSICAL THERAPY | Age: 44
Setting detail: RECURRING SERIES
Discharge: HOME | End: 2024-11-24
Payer: COMMERCIAL

## 2024-11-21 PROCEDURE — 97110 THERAPEUTIC EXERCISES: CPT

## 2024-11-21 PROCEDURE — 97140 MANUAL THERAPY 1/> REGIONS: CPT

## 2024-11-21 ASSESSMENT — PAIN SCALES - GENERAL: PAINLEVEL_OUTOF10: 3

## 2024-11-21 NOTE — PROGRESS NOTES
Gosia Kimble  : 1980  Primary: [facility] Shared Services (Commercial)  Secondary:  [facility] @ [address] [address]  [facility] [address]  Phone: [phone]  Fax: [phone] Plan Frequency: 2 times a week for 8 weeks (With potential to decrease frequency to 1x week based on patient presentation)  Plan of Care/Certification Expiration Date: 12/15/24 (POC starting 10/16/24)        Plan of Care/Certification Expiration Date:  Plan of Care/Certification Expiration Date: 12/15/24 (POC starting 10/16/24)    Frequency/Duration: Plan Frequency: 2 times a week for 8 weeks (With potential to decrease frequency to 1x week based on patient presentation)      Time In/Out:   Time In: 802  Time Out: 901      PT Visit Info:    Progress Note Counter: 3      Visit Count:  11    OUTPATIENT PHYSICAL THERAPY:   Treatment Note 2024       Episode  (Foot/Ankle Pain)               Treatment Diagnosis:    Closed displaced fracture of lateral malleolus of left fibula with routine healing  Muscle weakness (generalized)  Difficulty in walking, not elsewhere classified  Medical/Referring Diagnosis:    Closed low lateral malleolus fracture, left, with routine healing, subsequent encounter [S82.62XD]    Referring Physician:  Arlene Canchola PA MD Orders:  PT Eval and Treat   Return MD Appt:  24   Date of Onset:  Onset Date: 24 (Patient reports fall on date listed.)     Allergies:   Patient has no known allergies.  Restrictions/Precautions:   WBAT      Interventions Planned (Treatment may consist of any combination of the following):     See Assessment Note    Subjective Comments: Patient reports follow up with MD went well; per pt MD has cleared patient to begin weaning form her ankle brace and can progress her weight bearing exercises as tolerated.  Initial Pain Level::     3/10  Post Session Pain Level:       2/10  Medications Last Reviewed:  2024  Updated

## 2024-12-04 ENCOUNTER — HOSPITAL ENCOUNTER (OUTPATIENT)
Dept: PHYSICAL THERAPY | Age: 44
Setting detail: RECURRING SERIES
Discharge: HOME | End: 2024-12-07
Payer: COMMERCIAL

## 2024-12-04 PROCEDURE — 97110 THERAPEUTIC EXERCISES: CPT

## 2024-12-04 PROCEDURE — 97140 MANUAL THERAPY 1/> REGIONS: CPT

## 2024-12-04 ASSESSMENT — PAIN SCALES - GENERAL: PAINLEVEL_OUTOF10: 0

## 2024-12-04 NOTE — PROGRESS NOTES
Gosia Kimble  : 1980  Primary: [facility] Shared Services (Commercial)  Secondary:  [facility] @ [address]  [facility] [address]  Phone: [phone]  Fax: [phone] Plan Frequency: 2 times a week for 8 weeks (With potential to decrease frequency to 1x week based on patient presentation)  Plan of Care/Certification Expiration Date: 12/15/24 (POC starting 10/16/24)        Plan of Care/Certification Expiration Date:  Plan of Care/Certification Expiration Date: 12/15/24 (POC starting 10/16/24)    Frequency/Duration: Plan Frequency: 2 times a week for 8 weeks (With potential to decrease frequency to 1x week based on patient presentation)      Time In/Out:   Time In: 1328  Time Out: 1424      PT Visit Info:    Progress Note Counter: 4      Visit Count:  12    OUTPATIENT PHYSICAL THERAPY:   Treatment Note 2024       Episode  (Foot/Ankle Pain)               Treatment Diagnosis:    Closed displaced fracture of lateral malleolus of left fibula with routine healing  Muscle weakness (generalized)  Difficulty in walking, not elsewhere classified  Medical/Referring Diagnosis:    Closed low lateral malleolus fracture, left, with routine healing, subsequent encounter [S82.62XD]    Referring Physician:  Arlene Canchola PA MD Orders:  PT Eval and Treat   Return MD Appt:  24   Date of Onset:  Onset Date: 24 (Patient reports fall on date listed.)     Allergies:   Patient has no known allergies.  Restrictions/Precautions:   WBAT      Interventions Planned (Treatment may consist of any combination of the following):     See Assessment Note    Subjective Comments: Patient reports being able to drive down to [place] without pain. She feels she is \"progressing.\"  Initial Pain Level::     0/10  Post Session Pain Level:       0/10  Medications Last Reviewed:  2024  Updated Objective Findings:  Gait: during pre gait exercise, focus on not looking down

## 2024-12-06 ENCOUNTER — HOSPITAL ENCOUNTER (OUTPATIENT)
Dept: PHYSICAL THERAPY | Age: 44
Setting detail: RECURRING SERIES
Discharge: HOME | End: 2024-12-09
Payer: COMMERCIAL

## 2024-12-06 PROCEDURE — 97140 MANUAL THERAPY 1/> REGIONS: CPT

## 2024-12-06 PROCEDURE — 97110 THERAPEUTIC EXERCISES: CPT

## 2024-12-06 ASSESSMENT — PAIN SCALES - GENERAL: PAINLEVEL_OUTOF10: 3

## 2024-12-06 NOTE — PROGRESS NOTES
Gosia Garcian  : 1980  Primary: [facility] Shared Services (Commercial)  Secondary:  [facility] @ [address]  [facility] [address]  Phone: [phone]  Fax: [phone] Plan Frequency: 2 times a week for 8 weeks (With potential to decrease frequency to 1x week based on patient presentation)  Plan of Care/Certification Expiration Date: 12/15/24 (POC starting 10/16/24)        Plan of Care/Certification Expiration Date:  Plan of Care/Certification Expiration Date: 12/15/24 (POC starting 10/16/24)    Frequency/Duration: Plan Frequency: 2 times a week for 8 weeks (With potential to decrease frequency to 1x week based on patient presentation)      Time In/Out:   Time In: 0900  Time Out: 09      PT Visit Info:    Progress Note Counter: 5      Visit Count:  13    OUTPATIENT PHYSICAL THERAPY:   Treatment Note 2024       Episode  (Foot/Ankle Pain)               Treatment Diagnosis:    Closed displaced fracture of lateral malleolus of left fibula with routine healing  Muscle weakness (generalized)  Difficulty in walking, not elsewhere classified  Medical/Referring Diagnosis:    Closed low lateral malleolus fracture, left, with routine healing, subsequent encounter [S82.62XD]    Referring Physician:  Arlene Canchola PA MD Orders:  PT Eval and Treat   Return MD Appt:  24   Date of Onset:  Onset Date: 24 (Patient reports fall on date listed.)     Allergies:   Patient has no known allergies.  Restrictions/Precautions:   WBAT      Interventions Planned (Treatment may consist of any combination of the following):     See Assessment Note    Subjective Comments: Patient reported she was sore after her last therapy session but the soreness has subsided over the past day.   Initial Pain Level::     3/10  Post Session Pain Level:       2/10  Medications Last Reviewed:  2024  Updated Objective Findings: Patient had noticeably more tone in Left everter

## 2024-12-11 ENCOUNTER — HOSPITAL ENCOUNTER (OUTPATIENT)
Dept: PHYSICAL THERAPY | Age: 44
Setting detail: RECURRING SERIES
Discharge: HOME | End: 2024-12-14
Payer: COMMERCIAL

## 2024-12-11 PROCEDURE — 97110 THERAPEUTIC EXERCISES: CPT

## 2024-12-11 PROCEDURE — 97140 MANUAL THERAPY 1/> REGIONS: CPT

## 2024-12-11 ASSESSMENT — PAIN SCALES - GENERAL: PAINLEVEL_OUTOF10: 2

## 2024-12-11 NOTE — THERAPY RECERTIFICATION
Gosia Garcian  : 1980  Primary: [facility] Shared Services (Commercial)  Secondary:  [facility] @ [place]  [address]  [facility] [address]  Phone: [phone]  Fax: [phone] Plan Frequency: 2 times a week for 8 weeks (With potential to decrease frequency to 1x week based on patient presentation)  Plan of Care/Certification Expiration Date: 25 (Start of POC 2024)        Plan of Care/Certification Expiration Date:  Plan of Care/Certification Expiration Date: 25 (Start of POC 2024)    Frequency/Duration: Plan Frequency: 2 times a week for 8 weeks (With potential to decrease frequency to 1x week based on patient presentation)      Time In/Out:   Time In: 901  Time Out: 1002      PT Visit Info:    Progress Note Counter: 0      Visit Count:  14                OUTPATIENT PHYSICAL THERAPY:             Recertification 2024               Episode (Foot/Ankle Pain)         Treatment Diagnosis:     Closed displaced fracture of lateral malleolus of left fibula with routine healing  Muscle weakness (generalized)  Difficulty in walking, not elsewhere classified  Medical/Referring Diagnosis:    Closed low lateral malleolus fracture, left, with routine healing, subsequent encounter [S82.62XD]    Referring Physician:  Arlene Canchola PA MD Orders:  PT Eval and Treat   Return MD Appt:  24  Date of Onset:  Onset Date: 24 (Patient reports fall on date listed.)     Allergies:  Patient has no known allergies.  Restrictions/Precautions:    WBAT      Medications Last Reviewed:  2024      OBJECTIVE     Patient denies any LE paresthesia. Patient denies any increase of symptoms with cough, sneeze or valsalva. Patient denies any saddle paresthesia or bowel/bladder deficits.     Observation/Orthostatic Postural Assessment:          Gait: ASO brace donned, mild antalgic gait pattern without use of AD and intermittent lateral deviations

## 2024-12-11 NOTE — PROGRESS NOTES
Gosia Lamin  : 1980  Primary: [facility] Shared Services (Commercial)  Secondary:  [facility] @ [place]  [facility] [address]  Phone: [phone]  Fax: [phone] Plan Frequency: 2 times a week for 8 weeks (With potential to decrease frequency to 1x week based on patient presentation)  Plan of Care/Certification Expiration Date: 25 (Start of POC 2024)        Plan of Care/Certification Expiration Date:  Plan of Care/Certification Expiration Date: 25 (Start of POC 2024)    Frequency/Duration: Plan Frequency: 2 times a week for 8 weeks (With potential to decrease frequency to 1x week based on patient presentation)      Time In/Out:   Time In: 901  Time Out: 1002      PT Visit Info:    Progress Note Counter: 0      Visit Count:  14    OUTPATIENT PHYSICAL THERAPY:   Treatment Note 2024       Episode  (Foot/Ankle Pain)               Treatment Diagnosis:    Closed displaced fracture of lateral malleolus of left fibula with routine healing  Muscle weakness (generalized)  Difficulty in walking, not elsewhere classified  Medical/Referring Diagnosis:    Closed low lateral malleolus fracture, left, with routine healing, subsequent encounter [S82.62XD]    Referring Physician:  Arlene Canchola PA MD Orders:  PT Eval and Treat   Return MD Appt:  24   Date of Onset:  Onset Date: 24 (Patient reports fall on date listed.)     Allergies:   Patient has no known allergies.  Restrictions/Precautions:   WBAT      Interventions Planned (Treatment may consist of any combination of the following):     See Assessment Note    Subjective Comments: Patient reports doing \"good\" today; mild fatigue reported at start of visit.   Initial Pain Level::     2/10  Post Session Pain Level:       2/10  Medications Last Reviewed:  2024  Updated Objective Findings: See recertfication note dated 2024.  Treatment   THERAPEUTIC

## 2024-12-12 NOTE — PROGRESS NOTES
Gosia Lamin  : 1980  Primary: [facility] Shared Services (Commercial)  Secondary:  [facility] [place]  [facility] [address]  Phone: [phone]  Fax: [phone] Plan Frequency: 2 times a week for 8 weeks (With potential to decrease frequency to 1x week based on patient presentation)  Plan of Care/Certification Expiration Date: 25 (Start of POC 2024)        Plan of Care/Certification Expiration Date:  Plan of Care/Certification Expiration Date: 25 (Start of POC 2024)    Frequency/Duration: Plan Frequency: 2 times a week for 8 weeks (With potential to decrease frequency to 1x week based on patient presentation)      Time In/Out:   Time In: 756  Time Out: 855      PT Visit Info:    Progress Note Counter: 1      Visit Count:  15    OUTPATIENT PHYSICAL THERAPY:   Treatment Note 2024       Episode  (Foot/Ankle Pain)               Treatment Diagnosis:    Closed displaced fracture of lateral malleolus of left fibula with routine healing  Muscle weakness (generalized)  Difficulty in walking, not elsewhere classified  Medical/Referring Diagnosis:    Closed low lateral malleolus fracture, left, with routine healing, subsequent encounter [S82.62XD]    Referring Physician:  Arlene Canchola PA MD Orders:  PT Eval and Treat   Return MD Appt:  24   Date of Onset:  Onset Date: 24 (Patient reports fall on date listed.)     Allergies:   Patient has no known allergies.  Restrictions/Precautions:   WBAT      Interventions Planned (Treatment may consist of any combination of the following):     See Assessment Note    Subjective Comments: Patient reports to therapy with a little soreness after ankle 4 way HEP.  Initial Pain Level::     2/10  Post Session Pain Level:       4/10  Medications Last Reviewed:  2024  Updated Objective Findings: Patient naturally walks with narrow DENISE.   Treatment   THERAPEUTIC EXERCISE: (44

## 2024-12-13 ENCOUNTER — HOSPITAL ENCOUNTER (OUTPATIENT)
Dept: PHYSICAL THERAPY | Age: 44
Setting detail: RECURRING SERIES
Discharge: HOME | End: 2024-12-16
Payer: COMMERCIAL

## 2024-12-13 PROCEDURE — 97140 MANUAL THERAPY 1/> REGIONS: CPT

## 2024-12-13 PROCEDURE — 97110 THERAPEUTIC EXERCISES: CPT

## 2024-12-13 ASSESSMENT — PAIN SCALES - GENERAL: PAINLEVEL_OUTOF10: 2

## 2024-12-18 ENCOUNTER — HOSPITAL ENCOUNTER (OUTPATIENT)
Dept: PHYSICAL THERAPY | Age: 44
Setting detail: RECURRING SERIES
Discharge: HOME | End: 2024-12-21
Payer: COMMERCIAL

## 2024-12-18 PROCEDURE — 97140 MANUAL THERAPY 1/> REGIONS: CPT

## 2024-12-18 PROCEDURE — 97110 THERAPEUTIC EXERCISES: CPT

## 2024-12-18 ASSESSMENT — PAIN SCALES - GENERAL: PAINLEVEL_OUTOF10: 3

## 2024-12-18 NOTE — PROGRESS NOTES
Gosia Lamin  : 1980  Primary: [facility] Shared Services (Commercial)  Secondary:  [facility] @ [place] [place]  [place] [address]  Phone: [phone]  Fax: [phone] Plan Frequency: 2 times a week for 8 weeks (With potential to decrease frequency to 1x week based on patient presentation)  Plan of Care/Certification Expiration Date: 25 (Start of POC 2024)        Plan of Care/Certification Expiration Date:  Plan of Care/Certification Expiration Date: 25 (Start of POC 2024)    Frequency/Duration: Plan Frequency: 2 times a week for 8 weeks (With potential to decrease frequency to 1x week based on patient presentation)      Time In/Out:   Time In: 08  Time Out: 904      PT Visit Info:    Progress Note Counter: 2      Visit Count:  16    OUTPATIENT PHYSICAL THERAPY:   Treatment Note 2024       Episode  (Foot/Ankle Pain)               Treatment Diagnosis:    Closed displaced fracture of lateral malleolus of left fibula with routine healing  Muscle weakness (generalized)  Difficulty in walking, not elsewhere classified  Medical/Referring Diagnosis:    Closed low lateral malleolus fracture, left, with routine healing, subsequent encounter [S82.62XD]    Referring Physician:  Arlene Canchola PA MD Orders:  PT Eval and Treat   Return MD Appt:  24   Date of Onset:  Onset Date: 24 (Patient reports fall on date listed.)     Allergies:   Patient has no known allergies.  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  Initial Pain Level::     3/10  Post Session Pain Level:       1/10  Medications Last Reviewed:

## 2024-12-20 ENCOUNTER — HOSPITAL ENCOUNTER (OUTPATIENT)
Dept: PHYSICAL THERAPY | Age: 44
Setting detail: RECURRING SERIES
Discharge: HOME | End: 2024-12-23
Payer: COMMERCIAL

## 2024-12-20 PROCEDURE — 97140 MANUAL THERAPY 1/> REGIONS: CPT

## 2024-12-20 PROCEDURE — 97110 THERAPEUTIC EXERCISES: CPT

## 2024-12-20 ASSESSMENT — PAIN SCALES - GENERAL: PAINLEVEL_OUTOF10: 2

## 2024-12-20 NOTE — PROGRESS NOTES
lateral malleolus.   Treatment   THERAPEUTIC EXERCISE: (45 minutes):  Exercises per grid below to improve mobility, strength, balance, and coordination.  Required minimal visual, verbal, manual, and tactile cues to promote proper body alignment, promote proper body posture, promote proper body mechanics, and promote proper body breathing techniques.  Progressed resistance, range, repetitions, and complexity of movement as indicated.   Date:  12/18/24 Date:  12/20/24 Date:  12/13/24   Activity/Exercise Parameters Parameters Parameters   Nu Step 10 mins, level 2, working on ROM and endurance 10 mins, level 2, working on ROM and endurance 10 mins, level 2, working on ROM and endurance   Calf stretch  Long sitting towel stretch, 3x30 sec  Long sitting towel stretch, 3x30 sec  Resume next visit   Plantar flexion stretch Seated edge of table, 3x30 sec -- --   Leg Raise -- -- --   Bridge -- -- --   Ankle Pump 3x10 3x10 3x10   4 way ankle Green TB, 2x10 each Green TB, 2x10 each Red TB, 2x10 each   Eversion/Inversion 3x10 AROM    5x5sec holds each inversion and eversion  3x10 AROM    5x5sec holds each inversion and eversion  --   Heel Raise Seated [id number] hold  2x5 standing with UE support Seated: 2x8    Toe Raise Seated [id number] hold  2x5 standing with UE support Seated: 2x8    Toe scrunches  3x10 seated 3x10 seated 3x10 seated   Toe yoga 3x10 seated 3x10 seated 2x10 seated   Arch lift 2x10 Verbal review 2x10   Standing weight shift 2 mins total, lateral shifting only today 2 mins total, lateral shifting only today 5 mins total, lateral shifting only today   Pre-gait exercise Pre-gait stepping: near wall without brace. 1x10 up/back Pre-gait stepping: near wall without brace. 1x10 up/back Pre-gait stepping: near wall without brace. X5 up/back    X3 with focus on stance width   Side stepping  Without brace, 1x (up/back) 15ft    Tilt board Seated, DF/PF, 2x10 Seated, DF/PF, 2x10 Resume next visit   BAPS board  Seated, CW, CCW,

## 2024-12-30 ENCOUNTER — APPOINTMENT (OUTPATIENT)
Dept: PHYSICAL THERAPY | Age: 44
End: 2024-12-30
Payer: COMMERCIAL

## 2025-01-06 ENCOUNTER — HOSPITAL ENCOUNTER (OUTPATIENT)
Dept: PHYSICAL THERAPY | Age: 45
Setting detail: RECURRING SERIES
Discharge: HOME | End: 2025-01-09
Payer: COMMERCIAL

## 2025-01-06 PROCEDURE — 97110 THERAPEUTIC EXERCISES: CPT

## 2025-01-06 PROCEDURE — 97140 MANUAL THERAPY 1/> REGIONS: CPT

## 2025-01-06 ASSESSMENT — PAIN SCALES - GENERAL: PAINLEVEL_OUTOF10: 3

## 2025-01-06 NOTE — PROGRESS NOTES
Gosia Kimble  : 1980  Primary: [employer])  Secondary:  [facility] [address] [address]  [facility] [address]  Phone: [phone]  Fax: [phone] Plan Frequency: 2 times a week for 8 weeks (With potential to decrease frequency to 1x week based on patient presentation)  Plan of Care/Certification Expiration Date: 25 (Start of POC 2024)        Plan of Care/Certification Expiration Date:  Plan of Care/Certification Expiration Date: 25 (Start of POC 2024)    Frequency/Duration: Plan Frequency: 2 times a week for 8 weeks (With potential to decrease frequency to 1x week based on patient presentation)      Time In/Out:   Time In: 802  Time Out: 857      PT Visit Info:    Progress Note Counter: 4      Visit Count:  18    OUTPATIENT PHYSICAL THERAPY:   Treatment Note 2025       Episode  (Foot/Ankle Pain)               Treatment Diagnosis:    Closed displaced fracture of lateral malleolus of left fibula with routine healing  Muscle weakness (generalized)  Difficulty in walking, not elsewhere classified  Medical/Referring Diagnosis:    Closed low lateral malleolus fracture, left, with routine healing, subsequent encounter [S82.62XD]    Referring Physician:  Arlene Canchola PA MD Orders:  PT Eval and Treat   Return MD Appt:  24   Date of Onset:  Onset Date: 24 (Patient reports fall on date listed.)     Allergies:   Patient has no known allergies.  Restrictions/Precautions:   WBAT      Interventions Planned (Treatment may consist of any combination of the following):     See Assessment Note    Subjective Comments: Patient reported last night she tripped over a box and fell onto her R side. Patient stated no symptoms increased after the fall.  Initial Pain Level::     3/10  Post Session Pain Level:       2/10  Medications Last Reviewed:  2025  Updated Objective Findings: Patient walked into therapy

## 2025-01-09 ENCOUNTER — HOSPITAL ENCOUNTER (OUTPATIENT)
Dept: PHYSICAL THERAPY | Age: 45
Setting detail: RECURRING SERIES
Discharge: HOME | End: 2025-01-12
Payer: COMMERCIAL

## 2025-01-09 PROCEDURE — 97140 MANUAL THERAPY 1/> REGIONS: CPT

## 2025-01-09 PROCEDURE — 97110 THERAPEUTIC EXERCISES: CPT

## 2025-01-09 ASSESSMENT — PAIN SCALES - GENERAL: PAINLEVEL_OUTOF10: 2

## 2025-01-09 NOTE — PROGRESS NOTES
PROM 4 mins, focusing on DF/PF and fore foot pro/sup. 4 mins, focusing on DF/PF and fore foot pro/sup. 4 mins, focusing on DF/PF and fore foot pro/sup.       Time spent with patient reviewing proper muscle recruitment and technique with exercises.     MANUAL THERAPY: (10 minutes):   Joint mobilization and Soft tissue mobilization was utilized and necessary because of the patient's restricted joint motion and painful spasm.   Soft Tissue Mobilization Left lateral malleolus, Gastroc, Soleus, Posterior Tib, Anterior Tib, gastroc, plantar fascia.  Joint Mobilization Talocrural AP Grade II, mid/fore foot sup/pro grade III.    MODALITIES: (0 minutes):        None today      HEP: As above; handouts given to patient for all exercises.       Treatment/Session Summary:    Treatment Assessment: Increased focus on progression of weight bearing activities today which patient tolerated well overall; mild knee pain reported in right knee during standing weight shifting exercise that quickly resolved and was not present at end of session today.    Communication/Consultation:  None today  Equipment provided today:  None  Recommendations/Intent for next treatment session: Next visit will focus on participation in graded exercise program to improve activity tolerance and functional indep.    >Total Treatment Billable Duration:  54 minutes  Time In: 0805  Time Out: 0901    William Carreon PT         Charge Capture  Events  [facility] Portal  Appt Desk  Attendance Report     Future Appointments   Date Time Provider Department Center   1/13/2025  8:00 AM Yonatan Butcher PTA [facility]   1/16/2025  8:00 AM William Carreon PT [facility]   1/20/2025  8:00 AM Yonatan Butcher PTA [facility]   1/23/2025  8:00 AM William Carreon PT [facility]   1/27/2025  8:00 AM Yonatan Butcher PTA [facility]   1/30/2025  8:00 AM William Carreon PT [facility]

## 2025-01-13 ENCOUNTER — HOSPITAL ENCOUNTER (OUTPATIENT)
Dept: PHYSICAL THERAPY | Age: 45
Setting detail: RECURRING SERIES
Discharge: HOME | End: 2025-01-16
Payer: COMMERCIAL

## 2025-01-13 PROCEDURE — 97140 MANUAL THERAPY 1/> REGIONS: CPT

## 2025-01-13 PROCEDURE — 97110 THERAPEUTIC EXERCISES: CPT

## 2025-01-13 ASSESSMENT — PAIN SCALES - GENERAL: PAINLEVEL_OUTOF10: 2

## 2025-01-13 NOTE — PROGRESS NOTES
Gosia Kimble  : 1980  Primary: [employer])  Secondary:  [facility] [place] [address]  [facility] [address]  Phone: [phone]  Fax: [phone] Plan Frequency: 2 times a week for 8 weeks (With potential to decrease frequency to 1x week based on patient presentation)  Plan of Care/Certification Expiration Date: 25 (Start of POC 2024)        Plan of Care/Certification Expiration Date:  Plan of Care/Certification Expiration Date: 25 (Start of POC 2024)    Frequency/Duration: Plan Frequency: 2 times a week for 8 weeks (With potential to decrease frequency to 1x week based on patient presentation)      Time In/Out:   Time In: 0800  Time Out: 08      PT Visit Info:    Progress Note Counter: 6      Visit Count:  20    OUTPATIENT PHYSICAL THERAPY:   Treatment Note 2025       Episode  (Foot/Ankle Pain)               Treatment Diagnosis:    Closed displaced fracture of lateral malleolus of left fibula with routine healing  Muscle weakness (generalized)  Difficulty in walking, not elsewhere classified  Medical/Referring Diagnosis:    Closed low lateral malleolus fracture, left, with routine healing, subsequent encounter [S82.62XD]    Referring Physician:  Arlene Canchola PA MD Orders:  PT Eval and Treat   Return MD Appt:  24   Date of Onset:  Onset Date: 24 (Patient reports fall on date listed.)     Allergies:   Patient has no known allergies.  Restrictions/Precautions:   WBAT      Interventions Planned (Treatment may consist of any combination of the following):     See Assessment Note    Subjective Comments: Patient reports she hasn't left the house since last Thursday. Patient stats minimal soreness after her last therapy session. Patient also stats she leaves  for [place].  Initial Pain Level::     2/10  Post Session Pain Level:       2/10  Medications Last Reviewed:

## 2025-01-16 ENCOUNTER — HOSPITAL ENCOUNTER (OUTPATIENT)
Dept: PHYSICAL THERAPY | Age: 45
Setting detail: RECURRING SERIES
Discharge: HOME | End: 2025-01-19
Payer: COMMERCIAL

## 2025-01-16 PROCEDURE — 97110 THERAPEUTIC EXERCISES: CPT

## 2025-01-16 PROCEDURE — 97140 MANUAL THERAPY 1/> REGIONS: CPT

## 2025-01-16 ASSESSMENT — PAIN SCALES - GENERAL: PAINLEVEL_OUTOF10: 2

## 2025-01-16 NOTE — PROGRESS NOTES
pro/sup.       Time spent with patient reviewing proper muscle recruitment and technique with exercises.     MANUAL THERAPY: (10 minutes):   Joint mobilization and Soft tissue mobilization was utilized and necessary because of the patient's restricted joint motion and painful spasm.   Soft Tissue Mobilization Left lateral malleolus, Gastroc, Soleus, Posterior Tib, Anterior Tib, gastroc, plantar fascia.  Joint Mobilization Talocrural AP Grade II, mid/fore foot sup/pro grade III.    MODALITIES: (0 minutes):        None today      HEP: As above; handouts given to patient for all exercises.       Treatment/Session Summary:    Treatment Assessment: Good tolerance to progression of standing exercise observed today.    Communication/Consultation:  None today  Equipment provided today:  None  Recommendations/Intent for next treatment session: Next visit will focus on participation in graded exercise program to improve activity tolerance and functional indep.    >Total Treatment Billable Duration:  53 minutes  Time In: 0803  Time Out: 0900    William Carreon PT         Charge Capture  Events  [facility] Portal  Appt Desk  Attendance Report     Future Appointments   Date Time Provider Department Center   1/20/2025  8:00 AM Yonatan Butcher PTA [facility]   1/23/2025  8:00 AM William Carreon PT [facility]   1/27/2025  8:00 AM Yonatan Butcher PTA [facility]   1/30/2025  8:00 AM William Carreon PT [facility]

## 2025-01-17 NOTE — PROGRESS NOTES
Step ups 4 in, 4x5  4 in, 2x5   PROM 4 mins, focusing on DF/PF and fore foot pro/sup. 4 mins, focusing on DF/PF and fore foot pro/sup. 4 mins, focusing on DF/PF and fore foot pro/sup.   Balance FT:EO -  Airex - 3x20s         Time spent with patient reviewing proper muscle recruitment and technique with exercises.     MANUAL THERAPY: (10 minutes):   Joint mobilization and Soft tissue mobilization was utilized and necessary because of the patient's restricted joint motion and painful spasm.   Soft Tissue Mobilization Left lateral malleolus, Gastroc, Soleus, Posterior Tib, Anterior Tib, gastroc, plantar fascia.  Joint Mobilization Talocrural AP Grade II, mid/fore foot sup/pro grade III.    MODALITIES: (0 minutes):        None today      HEP: As above; handouts given to patient for all exercises.       Treatment/Session Summary:    Treatment Assessment: Patient showed good tolerance to their therapy session today; added the FT:EO exercise into their therapy session to help improve balance and stability progression.    Communication/Consultation:  None today  Equipment provided today:  None  Recommendations/Intent for next treatment session: Next visit will focus on participation in graded exercise program to improve activity tolerance and functional indep.    >Total Treatment Billable Duration:  53 minutes  Time In: 0759  Time Out: 0832    YARA BUTCHER PTA         Charge Capture  Events  [employer] Portal  Appt Desk  Attendance Report     Future Appointments   Date Time Provider Department Center   1/23/2025  8:00 AM William Carreon PT [facility]   1/27/2025  8:00 AM Yara Butcher PTA [facility]   1/30/2025  8:00 AM William Carreon PT [facility]

## 2025-01-20 ENCOUNTER — HOSPITAL ENCOUNTER (OUTPATIENT)
Dept: PHYSICAL THERAPY | Age: 45
Setting detail: RECURRING SERIES
Discharge: HOME | End: 2025-01-23
Payer: COMMERCIAL

## 2025-01-20 PROCEDURE — 97140 MANUAL THERAPY 1/> REGIONS: CPT

## 2025-01-20 PROCEDURE — 97110 THERAPEUTIC EXERCISES: CPT

## 2025-01-20 ASSESSMENT — PAIN SCALES - GENERAL: PAINLEVEL_OUTOF10: 2

## 2025-01-23 ENCOUNTER — HOSPITAL ENCOUNTER (OUTPATIENT)
Dept: PHYSICAL THERAPY | Age: 45
Setting detail: RECURRING SERIES
Discharge: HOME | End: 2025-01-26
Payer: COMMERCIAL

## 2025-01-23 PROCEDURE — 97140 MANUAL THERAPY 1/> REGIONS: CPT

## 2025-01-23 PROCEDURE — 97110 THERAPEUTIC EXERCISES: CPT

## 2025-01-23 ASSESSMENT — PAIN SCALES - GENERAL: PAINLEVEL_OUTOF10: 2

## 2025-01-23 NOTE — PROGRESS NOTES
Gosia Kimble  : 1980  Primary: [employer])  Secondary:  [facility] [place]  [address]  [facility] [address]  Phone: [phone]  Fax: [phone] Plan Frequency: 2 times a week for 8 weeks (With potential to decrease frequency to 1x week based on patient presentation)  Plan of Care/Certification Expiration Date: 25 (Start of POC 2024)        Plan of Care/Certification Expiration Date:  Plan of Care/Certification Expiration Date: 25 (Start of POC 2024)    Frequency/Duration: Plan Frequency: 2 times a week for 8 weeks (With potential to decrease frequency to 1x week based on patient presentation)      Time In/Out:   Time In: 0800  Time Out: 08      PT Visit Info:    Progress Note Counter: 0      Visit Count:  23    OUTPATIENT PHYSICAL THERAPY:   Treatment Note 2025       Episode  (Foot/Ankle Pain)               Treatment Diagnosis:    Closed displaced fracture of lateral malleolus of left fibula with routine healing  Muscle weakness (generalized)  Difficulty in walking, not elsewhere classified  Medical/Referring Diagnosis:    Closed low lateral malleolus fracture, left, with routine healing, subsequent encounter [S82.62XD]    Referring Physician:  Arlene Canchola PA MD Orders:  PT Eval and Treat   Return MD Appt:  24   Date of Onset:  Onset Date: 24 (Patient reports fall on date listed.)     Allergies:   Patient has no known allergies.  Restrictions/Precautions:   WBAT      Interventions Planned (Treatment may consist of any combination of the following):     See Assessment Note    Subjective Comments: Patient reports pain levels are \"pretty good today\"; HEP compliance good.   Initial Pain Level::     2/10  Post Session Pain Level:       1/10  Medications Last Reviewed:  2025  Updated Objective Findings: See progress note dated 2025..    Treatment   THERAPEUTIC EXERCISE: (46

## 2025-01-23 NOTE — PROGRESS NOTES
Gosia Kimble  : 1980  Primary: [employer])  Secondary:  [facility] [place]  [address]  [facility] [address]  Phone: [phone]  Fax: [phone] Plan Frequency: 2 times a week for 8 weeks (With potential to decrease frequency to 1x week based on patient presentation)  Plan of Care/Certification Expiration Date: 25 (Start of POC 2024)        Plan of Care/Certification Expiration Date:  Plan of Care/Certification Expiration Date: 25 (Start of POC 2024)    Frequency/Duration: Plan Frequency: 2 times a week for 8 weeks (With potential to decrease frequency to 1x week based on patient presentation)      Time In/Out:   Time In: 0800  Time Out: 0859      PT Visit Info:    Progress Note Counter: 0      Visit Count:  23                OUTPATIENT PHYSICAL THERAPY:             Progress Report 2025               Episode (Foot/Ankle Pain)         Treatment Diagnosis:     Closed displaced fracture of lateral malleolus of left fibula with routine healing  Muscle weakness (generalized)  Difficulty in walking, not elsewhere classified  Medical/Referring Diagnosis:    Closed low lateral malleolus fracture, left, with routine healing, subsequent encounter [S82.62XD]    Referring Physician:  Arlene Canchola PA MD Orders:  PT Eval and Treat   Return MD Appt:  TBD by patient  Date of Onset:  Onset Date: 24 (Patient reports fall on date listed.)  Allergies:  Patient has no known allergies.  Restrictions/Precautions:    WBAT      Medications Last Reviewed:  2025      OBJECTIVE     Patient denies any LE paresthesia. Patient denies any increase of symptoms with cough, sneeze or valsalva. Patient denies any saddle paresthesia or bowel/bladder deficits.     Observation/Orthostatic Postural Assessment:          Gait: No ASO brace, Independent to supervision assist, intermittent mild antalgic pattern LLE and

## 2025-01-27 ENCOUNTER — HOSPITAL ENCOUNTER (OUTPATIENT)
Dept: PHYSICAL THERAPY | Age: 45
Setting detail: RECURRING SERIES
Discharge: HOME | End: 2025-01-30
Payer: COMMERCIAL

## 2025-01-27 PROCEDURE — 97110 THERAPEUTIC EXERCISES: CPT

## 2025-01-27 PROCEDURE — 97140 MANUAL THERAPY 1/> REGIONS: CPT

## 2025-01-27 ASSESSMENT — PAIN SCALES - GENERAL: PAINLEVEL_OUTOF10: 4

## 2025-01-27 NOTE — PROGRESS NOTES
and fore foot pro/sup. 4 mins, focusing on DF/PF and fore foot pro/sup.   Balance FT:EO -  Airex - 3x20s FT:EO -  Airex - 3x20s    Tandem stance; 2k90rts each FT:EO -  Airex - 3x20s    Tandem stance; 2d41uen each       Time spent with patient reviewing proper muscle recruitment and technique with exercises.     MANUAL THERAPY: (8 minutes):   Joint mobilization and Soft tissue mobilization was utilized and necessary because of the patient's restricted joint motion and painful spasm.   Soft Tissue Mobilization Left lateral malleolus, Gastroc, Soleus, Posterior Tib, Anterior Tib, gastroc, plantar fascia.  Joint Mobilization Talocrural AP Grade III, mid/fore foot sup/pro grade III.    MODALITIES: (0 minutes):        None today      HEP: As above; handouts given to patient for all exercises.       Treatment/Session Summary:    Treatment Assessment: Patient showed good tolerance to their therapy session today; sets were increased during both the standing heel and toe raises to help improve strength progression.     Communication/Consultation:  None today  Equipment provided today:  None  Recommendations/Intent for next treatment session: Next visit will focus on participation in graded exercise program to improve activity tolerance and functional indep. Focus on progression of stair navigation to pt tolerance.    >Total Treatment Billable Duration: 54 minutes  Time In: 0800  Time Out: 0855    YARA ABBASI PTA         Charge Capture  Events  [facility] Portal  Appt Desk  Attendance Report     Future Appointments   Date Time Provider Department Center   1/30/2025  8:00 AM William Carreon PT [facility]   2/4/2025  8:00 AM William Carreon PT [facility]   2/6/2025  8:00 AM William Carreon PT [facility]   2/10/2025  9:00 AM William Carreon PT [facility]   2/13/2025  8:00 AM William Carreon PT [facility]

## 2025-01-30 ENCOUNTER — HOSPITAL ENCOUNTER (OUTPATIENT)
Dept: PHYSICAL THERAPY | Age: 45
Setting detail: RECURRING SERIES
End: 2025-01-30
Payer: COMMERCIAL

## 2025-01-30 PROCEDURE — 97110 THERAPEUTIC EXERCISES: CPT

## 2025-01-30 PROCEDURE — 97140 MANUAL THERAPY 1/> REGIONS: CPT

## 2025-01-30 ASSESSMENT — PAIN SCALES - GENERAL: PAINLEVEL_OUTOF10: 3

## 2025-01-30 NOTE — THERAPY RECERTIFICATION
Gosia Kimble  : 1980  Primary: [employer])  Secondary:  [facility] [place]  [address]  [facility] [address]  Phone: [phone]  Fax: [phone] Plan Frequency: 2 times a week for 8 weeks (With potential to decrease frequency to 1x week based on patient presentation)  Plan of Care/Certification Expiration Date: 25 (Start of POC 2025)        Plan of Care/Certification Expiration Date:  Plan of Care/Certification Expiration Date: 25 (Start of POC 2025)    Frequency/Duration: Plan Frequency: 2 times a week for 8 weeks (With potential to decrease frequency to 1x week based on patient presentation)      Time In/Out:   Time In: 0758  Time Out: 0900      PT Visit Info:    Progress Note Counter: 0      Visit Count:  25                OUTPATIENT PHYSICAL THERAPY:             Recertification 2025               Episode (Foot/Ankle Pain)         Treatment Diagnosis:     Closed displaced fracture of lateral malleolus of left fibula with routine healing  Muscle weakness (generalized)  Difficulty in walking, not elsewhere classified  Medical/Referring Diagnosis:    Closed low lateral malleolus fracture, left, with routine healing, subsequent encounter [S82.62XD]    Referring Physician:  Arlene Canchola PA MD Orders:  PT Eval and Treat   Return MD Appt:  TBD by patient  Date of Onset:  Onset Date: 24 (Patient reports fall on date listed.)  Allergies:  Patient has no known allergies.  Restrictions/Precautions:    WBAT      Medications Last Reviewed:  2025      OBJECTIVE     Patient denies any LE paresthesia. Patient denies any increase of symptoms with cough, sneeze or valsalva. Patient denies any saddle paresthesia or bowel/bladder deficits.     Observation/Orthostatic Postural Assessment:          Gait: No ASO brace, Independent to supervision assist, intermittent mild antalgic pattern LLE and  occasional lateral deviations to forward progression.    Palpation:          Patient tender to palpation on lateral malleolus and distal fibula; improve in severity from last re-assessment.    ROM:          NT: not tested   AROM/ PROM Left (degrees) Right (degrees)   Plantarflexion (PF) 50 50   Dorsiflexion (DF) 10 10   DF in Weight Bearing NT  NT   Inversion 42 20   Eversion 14 15   Great toe extension 60 60   Great toe flexion 35 40   Knee Flexion 120 120   Knee Extension 0 0     Strength:            Motion Tested Left   (*/5) Right  (*/5)   Dorsiflexion 4+ 5   Plantarflexion 4+ 4+   Inversion 4 5   Eversion 4 5   Great Toe Extension 5 5   Great Toe Flexion 5 5   Knee Flexion 5 5   Knee Extension 4+ 5   5 times sit to stand 18.74 sec (brace off, no UE assist)      Balance: Timed up and go: 22.46 sec (brace off)  Smith Balance scale: 44/56 (brace off)    Special Tests:          NT secondary to acute status s/p surgery.     Passive Accessory Motion:         L LE Talocrural mobility is decreased. Subtalar mobility normal. Midtarsal mobility normal. Tarsometatarsal mobility is normal. Metatarsophalangeal(MTP) mobility is normal.    Neurological Screen:              Myotomes: Key muscle strength testing through bilateral LE is WFL.   Dermatomes: Sensation to light touch for bilateral LE is intact and WFL.       Functional Mobility:         Sit to stand: No BUE assist; occasional reduced anterior weight shift observed however patient able to maintain balance through a set of 5 sit to stands.    Balance:          Pt able to slowly shift weight to SLS LLE and can stand tandem without difficulty at this time:   Tandem stance time: R: 15 sec, L: 10 sec   SLS: L: less than 3 seconds.         Outcome Measure:   Tool Used: FOOT AND ANKLE ABILITY MEASURE  Score:  Initial: 19/84 Most Recent: 70/116 (Date: 1/30/2025)   Interpretation of Score: For the \"Activities of Daily Living\", there are 21 questions each scored on a 5 point

## 2025-01-30 NOTE — PROGRESS NOTES
Gosia Kimble  : 1980  Primary: [employer]  Secondary:  [facility] [place]  [address]  [facility] [address]  Phone: [phone]  Fax: [phone] Plan Frequency: 2 times a week for 8 weeks (With potential to decrease frequency to 1x week based on patient presentation)  Plan of Care/Certification Expiration Date: 25 (Start of POC 2025)        Plan of Care/Certification Expiration Date:  Plan of Care/Certification Expiration Date: 25 (Start of POC 2025)    Frequency/Duration: Plan Frequency: 2 times a week for 8 weeks (With potential to decrease frequency to 1x week based on patient presentation)      Time In/Out:   Time In: 0758  Time Out: 0900      PT Visit Info:    Progress Note Counter: 0      Visit Count:  25    OUTPATIENT PHYSICAL THERAPY:   Treatment Note 2025       Episode  (Foot/Ankle Pain)               Treatment Diagnosis:    Closed displaced fracture of lateral malleolus of left fibula with routine healing  Muscle weakness (generalized)  Difficulty in walking, not elsewhere classified  Medical/Referring Diagnosis:    Closed low lateral malleolus fracture, left, with routine healing, subsequent encounter [S82.62XD]    Referring Physician:  Arlene Canchola PA MD Orders:  PT Eval and Treat   Return MD Appt:  24   Date of Onset:  Onset Date: 24 (Patient reports fall on date listed.)     Allergies:   Patient has no known allergies.  Restrictions/Precautions:   WBAT      Interventions Planned (Treatment may consist of any combination of the following):     See Assessment Note    Subjective Comments: Patient reports some increased soreness this morning unsure of cause.  Initial Pain Level::     3/10  Post Session Pain Level:       3/10  Medications Last Reviewed:  2025  Updated Objective Findings: See recertification note 2025.   Treatment   THERAPEUTIC EXERCISE: (46

## 2025-02-04 ENCOUNTER — HOSPITAL ENCOUNTER (OUTPATIENT)
Dept: PHYSICAL THERAPY | Age: 45
Setting detail: RECURRING SERIES
Discharge: HOME | End: 2025-02-07
Payer: COMMERCIAL

## 2025-02-04 PROCEDURE — 97110 THERAPEUTIC EXERCISES: CPT

## 2025-02-04 PROCEDURE — 97140 MANUAL THERAPY 1/> REGIONS: CPT

## 2025-02-04 ASSESSMENT — PAIN SCALES - GENERAL: PAINLEVEL_OUTOF10: 2

## 2025-02-04 NOTE — PROGRESS NOTES
Gosia Kimble  : 1980  Primary: [employer]  Secondary:  [facility] [place]  Hansa HERNANDEZ A  [facility] [address]  Phone: [phone]  Fax: [phone] Plan Frequency: 2 times a week for 8 weeks (With potential to decrease frequency to 1x week based on patient presentation)  Plan of Care/Certification Expiration Date: 25 (Start of POC 2025)        Plan of Care/Certification Expiration Date:  Plan of Care/Certification Expiration Date: 25 (Start of POC 2025)    Frequency/Duration: Plan Frequency: 2 times a week for 8 weeks (With potential to decrease frequency to 1x week based on patient presentation)      Time In/Out:   Time In: 08  Time Out: 901      PT Visit Info:    Progress Note Counter: 0      Visit Count:  26    OUTPATIENT PHYSICAL THERAPY:   Treatment Note 2025       Episode  (Foot/Ankle Pain)               Treatment Diagnosis:    Closed displaced fracture of lateral malleolus of left fibula with routine healing  Muscle weakness (generalized)  Difficulty in walking, not elsewhere classified  Medical/Referring Diagnosis:    Closed low lateral malleolus fracture, left, with routine healing, subsequent encounter [S82.62XD]    Referring Physician:  Arlene Canchola PA MD Orders:  PT Eval and Treat   Return MD Appt:  24   Date of Onset:  Onset Date: 24 (Patient reports fall on date listed.)     Allergies:   Patient has no known allergies.  Restrictions/Precautions:   WBAT      Interventions Planned (Treatment may consist of any combination of the following):     See Assessment Note    Subjective Comments: Patient reports soreness levels are less today that compared to last visit.  Initial Pain Level::     2/10  Post Session Pain Level:       1/10  Medications Last Reviewed:  2025  Updated Objective Findings: Gait: improved gait pattern observed today with reduced antalgic pattern and improve

## 2025-02-06 ENCOUNTER — HOSPITAL ENCOUNTER (OUTPATIENT)
Dept: PHYSICAL THERAPY | Age: 45
Setting detail: RECURRING SERIES
Discharge: HOME | End: 2025-02-09
Payer: COMMERCIAL

## 2025-02-06 PROCEDURE — 97140 MANUAL THERAPY 1/> REGIONS: CPT

## 2025-02-06 PROCEDURE — 97110 THERAPEUTIC EXERCISES: CPT

## 2025-02-06 ASSESSMENT — PAIN SCALES - GENERAL: PAINLEVEL_OUTOF10: 2

## 2025-02-06 NOTE — PROGRESS NOTES
Gosia Kimble  : 1980  Primary: [employer])  Secondary:  [facility] [place]  Hansa HERNANDEZ A  [facility] [address]  Phone: [phone]  Fax: [phone] Plan Frequency: 2 times a week for 8 weeks (With potential to decrease frequency to 1x week based on patient presentation)  Plan of Care/Certification Expiration Date: 25 (Start of POC 2025)        Plan of Care/Certification Expiration Date:  Plan of Care/Certification Expiration Date: 25 (Start of POC 2025)    Frequency/Duration: Plan Frequency: 2 times a week for 8 weeks (With potential to decrease frequency to 1x week based on patient presentation)      Time In/Out:   Time In: 802  Time Out: 859      PT Visit Info:    Progress Note Counter: 0      Visit Count:  27    OUTPATIENT PHYSICAL THERAPY:   Treatment Note 2025       Episode  (Foot/Ankle Pain)               Treatment Diagnosis:    Closed displaced fracture of lateral malleolus of left fibula with routine healing  Muscle weakness (generalized)  Difficulty in walking, not elsewhere classified  Medical/Referring Diagnosis:    Closed low lateral malleolus fracture, left, with routine healing, subsequent encounter [S82.62XD]    Referring Physician:  Arlene Canchola PA MD Orders:  PT Eval and Treat   Return MD Appt:  24   Date of Onset:  Onset Date: 24 (Patient reports fall on date listed.)     Allergies:   Patient has no known allergies.  Restrictions/Precautions:   WBAT      Interventions Planned (Treatment may consist of any combination of the following):     See Assessment Note    Subjective Comments: Patient reports mild stiffness this morning; minimal soreness reported after last visit.  Initial Pain Level::     2/10  Post Session Pain Level:       /10  Medications Last Reviewed:  2025  Updated Objective Findings: Mild increased antalgic gait pattern observed today compared to last

## 2025-02-10 ENCOUNTER — HOSPITAL ENCOUNTER (OUTPATIENT)
Dept: PHYSICAL THERAPY | Age: 45
Setting detail: RECURRING SERIES
Discharge: HOME | End: 2025-02-13
Payer: COMMERCIAL

## 2025-02-10 PROCEDURE — 97110 THERAPEUTIC EXERCISES: CPT

## 2025-02-10 PROCEDURE — 97140 MANUAL THERAPY 1/> REGIONS: CPT

## 2025-02-10 ASSESSMENT — PAIN SCALES - GENERAL: PAINLEVEL_OUTOF10: 1

## 2025-02-10 NOTE — PROGRESS NOTES
Gosia Kimble  : 1980  Primary: [employer]  Secondary:  [facility] [place]  Hansa HERNANDEZ A  [facility] [address]  Phone: [phone]  Fax: [phone] Plan Frequency: 2 times a week for 8 weeks (With potential to decrease frequency to 1x week based on patient presentation)  Plan of Care/Certification Expiration Date: 25 (Start of POC 2025)        Plan of Care/Certification Expiration Date:  Plan of Care/Certification Expiration Date: 25 (Start of POC 2025)    Frequency/Duration: Plan Frequency: 2 times a week for 8 weeks (With potential to decrease frequency to 1x week based on patient presentation)      Time In/Out:   Time In: 09  Time Out: 959      PT Visit Info:    Progress Note Counter: 0      Visit Count:  28    OUTPATIENT PHYSICAL THERAPY:   Treatment Note 2/10/2025       Episode  (Foot/Ankle Pain)               Treatment Diagnosis:    Closed displaced fracture of lateral malleolus of left fibula with routine healing  Muscle weakness (generalized)  Difficulty in walking, not elsewhere classified  Medical/Referring Diagnosis:    Closed low lateral malleolus fracture, left, with routine healing, subsequent encounter [S82.62XD]    Referring Physician:  Arlene Canchola PA MD Orders:  PT Eval and Treat   Return MD Appt:  24   Date of Onset:  Onset Date: 24 (Patient reports fall on date listed.)     Allergies:   Patient has no known allergies.  Restrictions/Precautions:   WBAT      Interventions Planned (Treatment may consist of any combination of the following):     See Assessment Note    Subjective Comments: Patient reports \"Feeling good today\"; pt is packing for upcoming trip out of town.  Initial Pain Level::     1/10  Post Session Pain Level:       1/10  Medications Last Reviewed:  2/10/2025  Updated Objective Findings: Improved gait speed observed today compared to last session.   Treatment

## 2025-02-13 ENCOUNTER — HOSPITAL ENCOUNTER (OUTPATIENT)
Dept: PHYSICAL THERAPY | Age: 45
Setting detail: RECURRING SERIES
Discharge: HOME | End: 2025-02-16
Payer: COMMERCIAL

## 2025-02-13 PROCEDURE — 97110 THERAPEUTIC EXERCISES: CPT

## 2025-02-13 PROCEDURE — 97140 MANUAL THERAPY 1/> REGIONS: CPT

## 2025-02-13 ASSESSMENT — PAIN SCALES - GENERAL: PAINLEVEL_OUTOF10: 0

## 2025-02-13 NOTE — PROGRESS NOTES
Gosia Kimble  : 1980  Primary: [employer]  Secondary:  [facility] [place]  Hansa HERNANDEZ A  [facility] [address]  Phone: [phone]  Fax: [phone] Plan Frequency: 2 times a week for 8 weeks (With potential to decrease frequency to 1x week based on patient presentation)  Plan of Care/Certification Expiration Date: 25 (Start of POC 2025)        Plan of Care/Certification Expiration Date:  Plan of Care/Certification Expiration Date: 25 (Start of POC 2025)    Frequency/Duration: Plan Frequency: 2 times a week for 8 weeks (With potential to decrease frequency to 1x week based on patient presentation)      Time In/Out:   Time In: 808  Time Out: 903      PT Visit Info:    Progress Note Counter: 0      Visit Count:  29    OUTPATIENT PHYSICAL THERAPY:   Treatment Note 2025       Episode  (Foot/Ankle Pain)               Treatment Diagnosis:    Closed displaced fracture of lateral malleolus of left fibula with routine healing  Muscle weakness (generalized)  Difficulty in walking, not elsewhere classified  Medical/Referring Diagnosis:    Closed low lateral malleolus fracture, left, with routine healing, subsequent encounter [S82.62XD]    Referring Physician:  Arlene Canchola PA MD Orders:  PT Eval and Treat   Return MD Appt:  24   Date of Onset:  Onset Date: 24 (Patient reports fall on date listed.)     Allergies:   Patient has no known allergies.  Restrictions/Precautions:   WBAT      Interventions Planned (Treatment may consist of any combination of the following):     See Assessment Note    Subjective Comments: Patient reports increased fatigue today; per pt stayed up too late last night packing per her upcoming trip. No complaints of ankle/foot pain today.  Initial Pain Level::     0/10  Post Session Pain Level:       0/10  Medications Last Reviewed:  2025  Updated Objective Findings: Mild

## 2025-03-06 ENCOUNTER — HOSPITAL ENCOUNTER (OUTPATIENT)
Dept: PHYSICAL THERAPY | Age: 45
Setting detail: RECURRING SERIES
Discharge: HOME | End: 2025-03-09
Payer: COMMERCIAL

## 2025-03-06 PROCEDURE — 97140 MANUAL THERAPY 1/> REGIONS: CPT

## 2025-03-06 PROCEDURE — 97110 THERAPEUTIC EXERCISES: CPT

## 2025-03-06 ASSESSMENT — PAIN SCALES - GENERAL: PAINLEVEL_OUTOF10: 0

## 2025-03-06 NOTE — PROGRESS NOTES
Gosia Kimble  : 1980  Primary: [employer]  Secondary:  [facility] [place]  Hansa HERNANDEZ A  [facility] [address]  Phone: [phone]  Fax: [phone] Plan Frequency: 2 times a week for 8 weeks (With potential to decrease frequency to 1x week based on patient presentation)  Plan of Care/Certification Expiration Date: 25 (Start of POC 2025)        Plan of Care/Certification Expiration Date:  Plan of Care/Certification Expiration Date: 25 (Start of POC 2025)    Frequency/Duration: Plan Frequency: 2 times a week for 8 weeks (With potential to decrease frequency to 1x week based on patient presentation)      Time In/Out:   Time In: 759  Time Out: 856      PT Visit Info:    Progress Note Counter: 0      Visit Count:  30    OUTPATIENT PHYSICAL THERAPY:   Treatment Note 3/6/2025       Episode  (Foot/Ankle Pain)               Treatment Diagnosis:    Closed displaced fracture of lateral malleolus of left fibula with routine healing  Muscle weakness (generalized)  Difficulty in walking, not elsewhere classified  Medical/Referring Diagnosis:    Closed low lateral malleolus fracture, left, with routine healing, subsequent encounter [S82.62XD]    Referring Physician:  Arlene Canchola PA MD Orders:  PT Eval and Treat   Return MD Appt:  24  Date of Onset:  Onset Date: 24 (Patient reports fall on date listed.)     Allergies:   Patient has no known allergies.  Restrictions/Precautions:   WBAT      Interventions Planned (Treatment may consist of any combination of the following):     See Assessment Note    Subjective Comments: Patient reports recent trip to [place] went well however she did have a fall without any injury while on trip; per pt slipped in snow and fell, pt reports did roll her ankle but felt better after 2-3 days.  Initial Pain Level::     0/10  Post Session Pain Level:       0/10  Medications Last Reviewed:

## 2025-03-13 ENCOUNTER — HOSPITAL ENCOUNTER (OUTPATIENT)
Dept: PHYSICAL THERAPY | Age: 45
Setting detail: RECURRING SERIES
Discharge: HOME | End: 2025-03-16
Payer: COMMERCIAL

## 2025-03-13 PROCEDURE — 97110 THERAPEUTIC EXERCISES: CPT

## 2025-03-13 PROCEDURE — 97140 MANUAL THERAPY 1/> REGIONS: CPT

## 2025-03-13 ASSESSMENT — PAIN SCALES - GENERAL: PAINLEVEL_OUTOF10: 1

## 2025-03-13 NOTE — PROGRESS NOTES
exercises.     MANUAL THERAPY: (15 minutes):   Joint mobilization and Soft tissue mobilization was utilized and necessary because of the patient's restricted joint motion and painful spasm.   Soft Tissue Mobilization Left lateral malleolus, Gastroc, Soleus, Posterior Tib, Anterior Tib, gastroc, plantar fascia.  Joint Mobilization Talocrural AP Grade III, mid/fore foot sup/pro grade III.    MODALITIES: (0 minutes):        None today      HEP: As above; handouts given to patient for all exercises.       Treatment/Session Summary:    Treatment Assessment: Patient showed good tolerance to their therapy session today; resumed the step up's exercise back into their therapy session to help improve strength progression.   Communication/Consultation:  None today  Equipment provided today:  None  Recommendations/Intent for next treatment session: Next visit will focus on participation in graded exercise program to improve activity tolerance and functional indep. Focus on progression of stair navigation to pt tolerance.    >Total Treatment Billable Duration: 53 minutes  Time In: 0800  Time Out: 0855    YARA BUTCHER PTA         Charge Capture  Events  [facility] Portal  Appt Desk  Attendance Report     Future Appointments   Date Time Provider Department Center   3/20/2025  8:00 AM Yara Butcher PTA [facility]   3/26/2025  8:00 AM William Carreon PT [facility]

## 2025-03-20 ENCOUNTER — HOSPITAL ENCOUNTER (OUTPATIENT)
Dept: PHYSICAL THERAPY | Age: 45
Setting detail: RECURRING SERIES
Discharge: HOME | End: 2025-03-23
Payer: COMMERCIAL

## 2025-03-20 PROCEDURE — 97140 MANUAL THERAPY 1/> REGIONS: CPT

## 2025-03-20 PROCEDURE — 97110 THERAPEUTIC EXERCISES: CPT

## 2025-03-20 ASSESSMENT — PAIN SCALES - GENERAL: PAINLEVEL_OUTOF10: 0

## 2025-03-20 NOTE — PROGRESS NOTES
Gosia Kimble  : 1980  Primary: [employer]  Secondary:  [facility] [place]  Hansa HERNANDEZ A  [facility] [address]  Phone: [phone]  Fax: [phone] Plan Frequency: 2 times a week for 8 weeks (With potential to decrease frequency to 1x week based on patient presentation)  Plan of Care/Certification Expiration Date: 25 (Start of POC 2025)        Plan of Care/Certification Expiration Date:  Plan of Care/Certification Expiration Date: 25 (Start of POC 2025)    Frequency/Duration: Plan Frequency: 2 times a week for 8 weeks (With potential to decrease frequency to 1x week based on patient presentation)      Time In/Out:   Time In: 0800  Time Out: 08      PT Visit Info:    Progress Note Counter: 7      Visit Count:  32    OUTPATIENT PHYSICAL THERAPY:   Treatment Note 3/20/2025       Episode  (Foot/Ankle Pain)               Treatment Diagnosis:    Closed displaced fracture of lateral malleolus of left fibula with routine healing  Muscle weakness (generalized)  Difficulty in walking, not elsewhere classified  Medical/Referring Diagnosis:    Closed low lateral malleolus fracture, left, with routine healing, subsequent encounter [S82.62XD]    Referring Physician:  Arlene Canchola PA MD Orders:  PT Eval and Treat   Return MD Appt:  24  Date of Onset:  Onset Date: 24 (Patient reports fall on date listed.)     Allergies:   Patient has no known allergies.  Restrictions/Precautions:   WBAT      Interventions Planned (Treatment may consist of any combination of the following):     See Assessment Note    Subjective Comments: Patient reports \"doing good\" with no pain today prior to therapy.   Initial Pain Level::     0/10  Post Session Pain Level:       0/10  Medications Last Reviewed:  3/20/2025  Updated Objective Findings: Fatigue: mild fatigue observed at the end of today's session.   Treatment   THERAPEUTIC

## 2025-03-26 ENCOUNTER — HOSPITAL ENCOUNTER (OUTPATIENT)
Dept: PHYSICAL THERAPY | Age: 45
Setting detail: RECURRING SERIES
Discharge: HOME | End: 2025-03-29
Payer: COMMERCIAL

## 2025-03-26 PROCEDURE — 97140 MANUAL THERAPY 1/> REGIONS: CPT

## 2025-03-26 PROCEDURE — 97110 THERAPEUTIC EXERCISES: CPT

## 2025-03-26 ASSESSMENT — PAIN SCALES - GENERAL: PAINLEVEL_OUTOF10: 0

## 2025-03-26 NOTE — PROGRESS NOTES
Gosia Kimble  : 1980  Primary: [employer]  Secondary:  [facility] [place]  Hansa HERNANDEZ A  [facility] [address]  Phone: [phone]  Fax: [phone] Plan Frequency: Discharge to HEP  Plan of Care/Certification Expiration Date: 25 (Start of POC 2025)        Plan of Care/Certification Expiration Date:  Plan of Care/Certification Expiration Date: 25 (Start of POC 2025)    Frequency/Duration: Plan Frequency: Discharge to HEP      Time In/Out:   Time In: 0757  Time Out: 902      PT Visit Info:    Progress Note Counter: 0      Visit Count:  33    OUTPATIENT PHYSICAL THERAPY:   Treatment Note 3/26/2025       Episode  (Foot/Ankle Pain)               Treatment Diagnosis:    Closed displaced fracture of lateral malleolus of left fibula with routine healing  Muscle weakness (generalized)  Difficulty in walking, not elsewhere classified  Medical/Referring Diagnosis:    Closed low lateral malleolus fracture, left, with routine healing, subsequent encounter [S82.62XD]    Referring Physician:  Arlene Canchola PA MD Orders:  PT Eval and Treat   Return MD Appt:  24  Date of Onset:  Onset Date: 24 (Patient reports fall on date listed.)     Allergies:   Patient has no known allergies.  Restrictions/Precautions:   WBAT      Interventions Planned (Treatment may consist of any combination of the following):     See Assessment Note    Subjective Comments: Patient reports feeling confident she can continue independently with HEP at this time.   Initial Pain Level::     0/10  Post Session Pain Level:       0/10  Medications Last Reviewed:  3/26/2025  Updated Objective Findings: See discharge note dated 3/26/2025.   Treatment   THERAPEUTIC EXERCISE: (40 minutes):  Exercises per grid below to improve mobility, strength, balance, and coordination.  Required minimal visual, verbal, manual, and tactile cues to promote proper body

## 2025-03-26 NOTE — THERAPY DISCHARGE
Gosia Kimble  : 1980  Primary: [employer]  Secondary:  [facility] [place]  Hansa REHMAN  [facility] [address]  Phone: [phone]  Fax: [phone] Plan Frequency: Discharge to HEP  Plan of Care/Certification Expiration Date: 25 (Start of POC 2025)        Plan of Care/Certification Expiration Date:  Plan of Care/Certification Expiration Date: 25 (Start of POC 2025)    Frequency/Duration: Plan Frequency: Discharge to HEP      Time In/Out:   Time In: 0757  Time Out: 09      PT Visit Info:    Progress Note Counter: 0      Visit Count:  33                OUTPATIENT PHYSICAL THERAPY:             Discharge Summary 3/26/2025               Episode (Foot/Ankle Pain)         Treatment Diagnosis:     Closed displaced fracture of lateral malleolus of left fibula with routine healing  Muscle weakness (generalized)  Difficulty in walking, not elsewhere classified  Medical/Referring Diagnosis:    Closed low lateral malleolus fracture, left, with routine healing, subsequent encounter [S82.62XD]    Referring Physician:  Arlene Canchola PA MD Orders:  PT Eval and Treat   Return MD Appt:  TBD by patient  Date of Onset:  Onset Date: 24 (Patient reports fall on date listed.)  Allergies:  Patient has no known allergies.  Restrictions/Precautions:    WBAT      Medications Last Reviewed:  3/26/2025      OBJECTIVE     Patient denies any LE paresthesia. Patient denies any increase of symptoms with cough, sneeze or valsalva. Patient denies any saddle paresthesia or bowel/bladder deficits.     Observation/Orthostatic Postural Assessment:          Gait: No ASO brace, Independent, decreased gait speed with no observed lateral deviations to forward progression observed.    Palpation:          Minimal tenderness to palpation reported today.    ROM:          NT: not tested   AROM/ PROM Left (degrees) Right (degrees)

## 2025-07-25 ENCOUNTER — HOSPITAL ENCOUNTER (OUTPATIENT)
Dept: MAMMOGRAPHY | Age: 45
Discharge: HOME | End: 2025-07-28
Payer: COMMERCIAL

## 2025-07-25 VITALS — WEIGHT: 293 LBS | BODY MASS INDEX: 48.82 KG/M2 | HEIGHT: 65 IN

## 2025-07-25 DIAGNOSIS — Z12.39 ENCOUNTER FOR BREAST CANCER SCREENING OTHER THAN MAMMOGRAM: ICD-10-CM

## 2025-07-25 PROCEDURE — 77063 BREAST TOMOSYNTHESIS BI: CPT
